# Patient Record
Sex: MALE | Race: WHITE | NOT HISPANIC OR LATINO | Employment: OTHER | URBAN - METROPOLITAN AREA
[De-identification: names, ages, dates, MRNs, and addresses within clinical notes are randomized per-mention and may not be internally consistent; named-entity substitution may affect disease eponyms.]

---

## 2017-04-10 ENCOUNTER — HOSPITAL ENCOUNTER (INPATIENT)
Facility: HOSPITAL | Age: 82
LOS: 2 days | Discharge: HOME | DRG: 064 | End: 2017-04-13
Attending: EMERGENCY MEDICINE | Admitting: INTERNAL MEDICINE
Payer: MEDICARE

## 2017-04-10 ENCOUNTER — APPOINTMENT (EMERGENCY)
Dept: RADIOLOGY | Facility: HOSPITAL | Age: 82
DRG: 064 | End: 2017-04-10
Payer: MEDICARE

## 2017-04-10 DIAGNOSIS — R41.82 CHANGE IN MENTAL STATE: Primary | ICD-10-CM

## 2017-04-10 DIAGNOSIS — G51.4 FACIAL TWITCHING: ICD-10-CM

## 2017-04-10 DIAGNOSIS — R82.81 PYURIA: ICD-10-CM

## 2017-04-10 DIAGNOSIS — N17.9 AKI (ACUTE KIDNEY INJURY) (HCC): ICD-10-CM

## 2017-04-10 DIAGNOSIS — I63.9 ACUTE CVA (CEREBROVASCULAR ACCIDENT) (HCC): ICD-10-CM

## 2017-04-10 LAB
ABO GROUP BLD: NORMAL
ANION GAP SERPL CALCULATED.3IONS-SCNC: 15 MMOL/L (ref 4–13)
APTT PPP: 23 SECONDS (ref 24–36)
BACTERIA UR QL AUTO: ABNORMAL /HPF
BILIRUB UR QL STRIP: NEGATIVE
BLD GP AB SCN SERPL QL: NEGATIVE
BUN SERPL-MCNC: 37 MG/DL (ref 5–25)
CALCIUM SERPL-MCNC: 9 MG/DL (ref 8.3–10.1)
CHLORIDE SERPL-SCNC: 100 MMOL/L (ref 100–108)
CLARITY UR: ABNORMAL
CO2 SERPL-SCNC: 23 MMOL/L (ref 21–32)
COLOR UR: ABNORMAL
CREAT SERPL-MCNC: 1.94 MG/DL (ref 0.6–1.3)
CREAT UR-MCNC: <13 MG/DL
ERYTHROCYTE [DISTWIDTH] IN BLOOD BY AUTOMATED COUNT: 17.3 % (ref 11.6–15.1)
ERYTHROCYTE [SEDIMENTATION RATE] IN BLOOD: 11 MM/HOUR (ref 2–10)
GFR SERPL CREATININE-BSD FRML MDRD: 32.7 ML/MIN/1.73SQ M
GLUCOSE SERPL-MCNC: 97 MG/DL (ref 65–140)
GLUCOSE UR STRIP-MCNC: NEGATIVE MG/DL
HCT VFR BLD AUTO: 29.4 % (ref 42–52)
HGB BLD-MCNC: 9.3 G/DL (ref 14–18)
HGB UR QL STRIP.AUTO: ABNORMAL
INR PPP: 0.95 (ref 0.86–1.16)
KETONES UR STRIP-MCNC: ABNORMAL MG/DL
LEUKOCYTE ESTERASE UR QL STRIP: ABNORMAL
MCH RBC QN AUTO: 24.3 PG (ref 27–31)
MCHC RBC AUTO-ENTMCNC: 31.5 G/DL (ref 31.4–37.4)
MCV RBC AUTO: 77 FL (ref 82–98)
NITRITE UR QL STRIP: POSITIVE
NON-SQ EPI CELLS URNS QL MICRO: ABNORMAL /HPF
PH UR STRIP.AUTO: 6 [PH] (ref 5–9)
PLATELET # BLD AUTO: 196 THOUSANDS/UL (ref 130–400)
PLATELET # BLD AUTO: 234 THOUSANDS/UL (ref 130–400)
PMV BLD AUTO: 10.6 FL (ref 8.9–12.7)
PMV BLD AUTO: 10.8 FL (ref 8.9–12.7)
POTASSIUM SERPL-SCNC: 4.1 MMOL/L (ref 3.5–5.3)
PROT UR STRIP-MCNC: NEGATIVE MG/DL
PROTHROMBIN TIME: 10 SECONDS (ref 9.4–11.7)
RBC # BLD AUTO: 3.82 MILLION/UL (ref 4.7–6.1)
RBC #/AREA URNS AUTO: ABNORMAL /HPF
RH BLD: POSITIVE
SODIUM 24H UR-SCNC: 122 MOL/L
SODIUM SERPL-SCNC: 138 MMOL/L (ref 136–145)
SP GR UR STRIP.AUTO: 1.01 (ref 1–1.03)
TROPONIN I SERPL-MCNC: 0.02 NG/ML
UROBILINOGEN UR QL STRIP.AUTO: 0.2 E.U./DL
WBC # BLD AUTO: 4.8 THOUSAND/UL (ref 4.8–10.8)
WBC #/AREA URNS AUTO: ABNORMAL /HPF

## 2017-04-10 PROCEDURE — 94760 N-INVAS EAR/PLS OXIMETRY 1: CPT

## 2017-04-10 PROCEDURE — 96361 HYDRATE IV INFUSION ADD-ON: CPT

## 2017-04-10 PROCEDURE — 71010 HB CHEST X-RAY 1 VIEW FRONTAL (PORTABLE): CPT

## 2017-04-10 PROCEDURE — 99285 EMERGENCY DEPT VISIT HI MDM: CPT

## 2017-04-10 PROCEDURE — 87040 BLOOD CULTURE FOR BACTERIA: CPT | Performed by: INTERNAL MEDICINE

## 2017-04-10 PROCEDURE — 85652 RBC SED RATE AUTOMATED: CPT | Performed by: EMERGENCY MEDICINE

## 2017-04-10 PROCEDURE — 87081 CULTURE SCREEN ONLY: CPT | Performed by: INTERNAL MEDICINE

## 2017-04-10 PROCEDURE — 80048 BASIC METABOLIC PNL TOTAL CA: CPT | Performed by: EMERGENCY MEDICINE

## 2017-04-10 PROCEDURE — 86900 BLOOD TYPING SEROLOGIC ABO: CPT | Performed by: EMERGENCY MEDICINE

## 2017-04-10 PROCEDURE — 87077 CULTURE AEROBIC IDENTIFY: CPT | Performed by: EMERGENCY MEDICINE

## 2017-04-10 PROCEDURE — 81001 URINALYSIS AUTO W/SCOPE: CPT | Performed by: EMERGENCY MEDICINE

## 2017-04-10 PROCEDURE — 93005 ELECTROCARDIOGRAM TRACING: CPT | Performed by: EMERGENCY MEDICINE

## 2017-04-10 PROCEDURE — 82570 ASSAY OF URINE CREATININE: CPT | Performed by: INTERNAL MEDICINE

## 2017-04-10 PROCEDURE — 85610 PROTHROMBIN TIME: CPT | Performed by: EMERGENCY MEDICINE

## 2017-04-10 PROCEDURE — 87086 URINE CULTURE/COLONY COUNT: CPT | Performed by: EMERGENCY MEDICINE

## 2017-04-10 PROCEDURE — 93005 ELECTROCARDIOGRAM TRACING: CPT

## 2017-04-10 PROCEDURE — 86901 BLOOD TYPING SEROLOGIC RH(D): CPT | Performed by: EMERGENCY MEDICINE

## 2017-04-10 PROCEDURE — 36415 COLL VENOUS BLD VENIPUNCTURE: CPT | Performed by: EMERGENCY MEDICINE

## 2017-04-10 PROCEDURE — 86850 RBC ANTIBODY SCREEN: CPT | Performed by: EMERGENCY MEDICINE

## 2017-04-10 PROCEDURE — 84300 ASSAY OF URINE SODIUM: CPT | Performed by: INTERNAL MEDICINE

## 2017-04-10 PROCEDURE — 85730 THROMBOPLASTIN TIME PARTIAL: CPT | Performed by: EMERGENCY MEDICINE

## 2017-04-10 PROCEDURE — 84484 ASSAY OF TROPONIN QUANT: CPT | Performed by: EMERGENCY MEDICINE

## 2017-04-10 PROCEDURE — 96360 HYDRATION IV INFUSION INIT: CPT

## 2017-04-10 PROCEDURE — 87186 SC STD MICRODIL/AGAR DIL: CPT | Performed by: EMERGENCY MEDICINE

## 2017-04-10 PROCEDURE — 70450 CT HEAD/BRAIN W/O DYE: CPT

## 2017-04-10 PROCEDURE — 85049 AUTOMATED PLATELET COUNT: CPT | Performed by: INTERNAL MEDICINE

## 2017-04-10 PROCEDURE — 85027 COMPLETE CBC AUTOMATED: CPT | Performed by: EMERGENCY MEDICINE

## 2017-04-10 RX ORDER — PANTOPRAZOLE SODIUM 40 MG/1
40 TABLET, DELAYED RELEASE ORAL DAILY
Status: DISCONTINUED | OUTPATIENT
Start: 2017-04-11 | End: 2017-04-13 | Stop reason: HOSPADM

## 2017-04-10 RX ORDER — MAGNESIUM HYDROXIDE/ALUMINUM HYDROXICE/SIMETHICONE 120; 1200; 1200 MG/30ML; MG/30ML; MG/30ML
30 SUSPENSION ORAL EVERY 4 HOURS PRN
Status: DISCONTINUED | OUTPATIENT
Start: 2017-04-10 | End: 2017-04-11

## 2017-04-10 RX ORDER — PANTOPRAZOLE SODIUM 40 MG/1
40 TABLET, DELAYED RELEASE ORAL DAILY
COMMUNITY

## 2017-04-10 RX ORDER — ATORVASTATIN CALCIUM 80 MG/1
80 TABLET, FILM COATED ORAL EVERY EVENING
Status: DISCONTINUED | OUTPATIENT
Start: 2017-04-10 | End: 2017-04-13

## 2017-04-10 RX ORDER — AMOXICILLIN 250 MG
1 CAPSULE ORAL 2 TIMES DAILY
Status: DISCONTINUED | OUTPATIENT
Start: 2017-04-10 | End: 2017-04-13 | Stop reason: HOSPADM

## 2017-04-10 RX ORDER — MELOXICAM 15 MG/1
15 TABLET ORAL DAILY
COMMUNITY
End: 2017-04-13 | Stop reason: HOSPADM

## 2017-04-10 RX ORDER — SODIUM CHLORIDE 9 MG/ML
75 INJECTION, SOLUTION INTRAVENOUS CONTINUOUS
Status: DISCONTINUED | OUTPATIENT
Start: 2017-04-10 | End: 2017-04-13

## 2017-04-10 RX ORDER — HEPARIN SODIUM 5000 [USP'U]/ML
5000 INJECTION, SOLUTION INTRAVENOUS; SUBCUTANEOUS EVERY 8 HOURS SCHEDULED
Status: DISCONTINUED | OUTPATIENT
Start: 2017-04-10 | End: 2017-04-11

## 2017-04-10 RX ORDER — SODIUM CHLORIDE 9 MG/ML
125 INJECTION, SOLUTION INTRAVENOUS CONTINUOUS
Status: DISCONTINUED | OUTPATIENT
Start: 2017-04-10 | End: 2017-04-10

## 2017-04-10 RX ADMIN — SODIUM CHLORIDE 500 ML: 0.9 INJECTION, SOLUTION INTRAVENOUS at 10:44

## 2017-04-10 RX ADMIN — CEFTRIAXONE 1000 MG: 1 INJECTION, POWDER, FOR SOLUTION INTRAMUSCULAR; INTRAVENOUS at 20:35

## 2017-04-10 RX ADMIN — SODIUM CHLORIDE 125 ML/HR: 0.9 INJECTION, SOLUTION INTRAVENOUS at 12:48

## 2017-04-10 RX ADMIN — Medication 1 TABLET: at 19:56

## 2017-04-10 RX ADMIN — SODIUM CHLORIDE 75 ML/HR: 0.9 INJECTION, SOLUTION INTRAVENOUS at 19:56

## 2017-04-10 RX ADMIN — ATORVASTATIN CALCIUM 80 MG: 80 TABLET, FILM COATED ORAL at 19:56

## 2017-04-10 RX ADMIN — HEPARIN SODIUM 5000 UNITS: 5000 INJECTION, SOLUTION INTRAVENOUS; SUBCUTANEOUS at 22:04

## 2017-04-10 RX ADMIN — ALUMINUM HYDROXIDE, MAGNESIUM HYDROXIDE, AND SIMETHICONE 30 ML: 200; 200; 20 SUSPENSION ORAL at 22:50

## 2017-04-11 ENCOUNTER — APPOINTMENT (OUTPATIENT)
Dept: OCCUPATIONAL THERAPY | Facility: HOSPITAL | Age: 82
DRG: 064 | End: 2017-04-11
Payer: MEDICARE

## 2017-04-11 ENCOUNTER — APPOINTMENT (OUTPATIENT)
Dept: RADIOLOGY | Facility: HOSPITAL | Age: 82
DRG: 064 | End: 2017-04-11
Payer: MEDICARE

## 2017-04-11 PROBLEM — I10 ACCELERATED HYPERTENSION: Status: ACTIVE | Noted: 2017-04-11

## 2017-04-11 PROBLEM — N17.9 AKI (ACUTE KIDNEY INJURY) (HCC): Status: ACTIVE | Noted: 2017-04-11

## 2017-04-11 PROBLEM — E78.5 HYPERLIPIDEMIA: Status: ACTIVE | Noted: 2017-04-11

## 2017-04-11 PROBLEM — E88.09 HYPOALBUMINEMIA: Status: ACTIVE | Noted: 2017-04-11

## 2017-04-11 PROBLEM — R41.82 ALTERED MENTAL STATUS: Status: ACTIVE | Noted: 2017-04-11

## 2017-04-11 PROBLEM — D64.9 ANEMIA: Status: ACTIVE | Noted: 2017-04-11

## 2017-04-11 PROBLEM — R82.81 PYURIA: Status: ACTIVE | Noted: 2017-04-11

## 2017-04-11 PROBLEM — R47.81 SLURRED SPEECH: Status: ACTIVE | Noted: 2017-04-11

## 2017-04-11 LAB
ALBUMIN SERPL BCP-MCNC: 2.6 G/DL (ref 3.5–5)
ALP SERPL-CCNC: 57 U/L (ref 46–116)
ALT SERPL W P-5'-P-CCNC: 12 U/L (ref 12–78)
ANION GAP SERPL CALCULATED.3IONS-SCNC: 12 MMOL/L (ref 4–13)
AST SERPL W P-5'-P-CCNC: 18 U/L (ref 5–45)
BASOPHILS # BLD AUTO: 0 THOUSANDS/ΜL (ref 0–0.1)
BASOPHILS NFR BLD AUTO: 0 % (ref 0–1)
BILIRUB DIRECT SERPL-MCNC: 0.1 MG/DL (ref 0–0.2)
BILIRUB SERPL-MCNC: 0.2 MG/DL (ref 0.2–1)
BILIRUB UR QL STRIP: NEGATIVE
BUN SERPL-MCNC: 30 MG/DL (ref 5–25)
CALCIUM SERPL-MCNC: 7.9 MG/DL (ref 8.3–10.1)
CHLORIDE SERPL-SCNC: 106 MMOL/L (ref 100–108)
CHOLEST SERPL-MCNC: 197 MG/DL (ref 50–200)
CLARITY UR: ABNORMAL
CO2 SERPL-SCNC: 20 MMOL/L (ref 21–32)
COLOR UR: ABNORMAL
CREAT SERPL-MCNC: 1.52 MG/DL (ref 0.6–1.3)
EOSINOPHIL # BLD AUTO: 0.1 THOUSAND/ΜL (ref 0–0.61)
EOSINOPHIL NFR BLD AUTO: 3 % (ref 0–6)
ERYTHROCYTE [DISTWIDTH] IN BLOOD BY AUTOMATED COUNT: 17.2 % (ref 11.6–15.1)
FERRITIN SERPL-MCNC: 11 NG/ML (ref 8–388)
FOLATE SERPL-MCNC: 5.2 NG/ML (ref 3.1–17.5)
GFR SERPL CREATININE-BSD FRML MDRD: 43.3 ML/MIN/1.73SQ M
GIANT PLATELETS BLD QL SMEAR: PRESENT
GLUCOSE SERPL-MCNC: 80 MG/DL (ref 65–140)
GLUCOSE UR STRIP-MCNC: NEGATIVE MG/DL
HCT VFR BLD AUTO: 24.4 % (ref 42–52)
HCT VFR BLD AUTO: 27.9 % (ref 42–52)
HDLC SERPL-MCNC: 49 MG/DL (ref 40–60)
HGB BLD-MCNC: 7.7 G/DL (ref 14–18)
HGB BLD-MCNC: 8.8 G/DL (ref 14–18)
HGB UR QL STRIP.AUTO: ABNORMAL
HYPERCHROMIA BLD QL SMEAR: PRESENT
IRON SATN MFR SERPL: 10 %
IRON SERPL-MCNC: 33 UG/DL (ref 65–175)
KETONES UR STRIP-MCNC: ABNORMAL MG/DL
LDLC SERPL CALC-MCNC: 134 MG/DL (ref 0–100)
LEUKOCYTE ESTERASE UR QL STRIP: ABNORMAL
LYMPHOCYTES # BLD AUTO: 1.4 THOUSANDS/ΜL (ref 0.6–4.47)
LYMPHOCYTES NFR BLD AUTO: 25 % (ref 14–44)
MAGNESIUM SERPL-MCNC: 2.1 MG/DL (ref 1.6–2.6)
MCH RBC QN AUTO: 24.5 PG (ref 27–31)
MCHC RBC AUTO-ENTMCNC: 31.7 G/DL (ref 31.4–37.4)
MCV RBC AUTO: 77 FL (ref 82–98)
MICROCYTES BLD QL AUTO: PRESENT
MONOCYTES # BLD AUTO: 0.6 THOUSAND/ΜL (ref 0.17–1.22)
MONOCYTES NFR BLD AUTO: 10 % (ref 4–12)
NEUTROPHILS # BLD AUTO: 3.6 THOUSANDS/ΜL (ref 1.85–7.62)
NEUTS SEG NFR BLD AUTO: 63 % (ref 43–75)
NITRITE UR QL STRIP: POSITIVE
NON-SQ EPI CELLS URNS QL MICRO: ABNORMAL /HPF
NRBC BLD AUTO-RTO: 0 /100 WBCS
PH UR STRIP.AUTO: 6 [PH] (ref 5–9)
PLATELET # BLD AUTO: 179 THOUSANDS/UL (ref 130–400)
PLATELET BLD QL SMEAR: ADEQUATE
PMV BLD AUTO: 11 FL (ref 8.9–12.7)
POTASSIUM SERPL-SCNC: 4.1 MMOL/L (ref 3.5–5.3)
PROT SERPL-MCNC: 5.7 G/DL (ref 6.4–8.2)
PROT UR STRIP-MCNC: ABNORMAL MG/DL
RBC # BLD AUTO: 3.15 MILLION/UL (ref 4.7–6.1)
RBC #/AREA URNS AUTO: ABNORMAL /HPF
SODIUM SERPL-SCNC: 138 MMOL/L (ref 136–145)
SP GR UR STRIP.AUTO: 1.02 (ref 1–1.03)
TIBC SERPL-MCNC: 331 UG/DL (ref 250–450)
TRIGL SERPL-MCNC: 70 MG/DL
UROBILINOGEN UR QL STRIP.AUTO: 0.2 E.U./DL
VIT B12 SERPL-MCNC: 347 PG/ML (ref 100–900)
WBC # BLD AUTO: 5.8 THOUSAND/UL (ref 4.8–10.8)
WBC #/AREA URNS AUTO: ABNORMAL /HPF

## 2017-04-11 PROCEDURE — G8979 MOBILITY GOAL STATUS: HCPCS

## 2017-04-11 PROCEDURE — 80061 LIPID PANEL: CPT | Performed by: INTERNAL MEDICINE

## 2017-04-11 PROCEDURE — 97162 PT EVAL MOD COMPLEX 30 MIN: CPT

## 2017-04-11 PROCEDURE — 82607 VITAMIN B-12: CPT | Performed by: INTERNAL MEDICINE

## 2017-04-11 PROCEDURE — 92610 EVALUATE SWALLOWING FUNCTION: CPT

## 2017-04-11 PROCEDURE — 80076 HEPATIC FUNCTION PANEL: CPT | Performed by: INTERNAL MEDICINE

## 2017-04-11 PROCEDURE — 81001 URINALYSIS AUTO W/SCOPE: CPT | Performed by: INTERNAL MEDICINE

## 2017-04-11 PROCEDURE — 82746 ASSAY OF FOLIC ACID SERUM: CPT | Performed by: INTERNAL MEDICINE

## 2017-04-11 PROCEDURE — 87147 CULTURE TYPE IMMUNOLOGIC: CPT | Performed by: INTERNAL MEDICINE

## 2017-04-11 PROCEDURE — G8997 SWALLOW GOAL STATUS: HCPCS

## 2017-04-11 PROCEDURE — 85025 COMPLETE CBC W/AUTO DIFF WBC: CPT | Performed by: INTERNAL MEDICINE

## 2017-04-11 PROCEDURE — G8988 SELF CARE GOAL STATUS: HCPCS

## 2017-04-11 PROCEDURE — 85014 HEMATOCRIT: CPT | Performed by: INTERNAL MEDICINE

## 2017-04-11 PROCEDURE — 87086 URINE CULTURE/COLONY COUNT: CPT | Performed by: INTERNAL MEDICINE

## 2017-04-11 PROCEDURE — 82272 OCCULT BLD FECES 1-3 TESTS: CPT | Performed by: INTERNAL MEDICINE

## 2017-04-11 PROCEDURE — 87186 SC STD MICRODIL/AGAR DIL: CPT | Performed by: INTERNAL MEDICINE

## 2017-04-11 PROCEDURE — 82728 ASSAY OF FERRITIN: CPT | Performed by: INTERNAL MEDICINE

## 2017-04-11 PROCEDURE — 93880 EXTRACRANIAL BILAT STUDY: CPT

## 2017-04-11 PROCEDURE — 80048 BASIC METABOLIC PNL TOTAL CA: CPT | Performed by: INTERNAL MEDICINE

## 2017-04-11 PROCEDURE — 83735 ASSAY OF MAGNESIUM: CPT | Performed by: INTERNAL MEDICINE

## 2017-04-11 PROCEDURE — 97166 OT EVAL MOD COMPLEX 45 MIN: CPT

## 2017-04-11 PROCEDURE — G8987 SELF CARE CURRENT STATUS: HCPCS

## 2017-04-11 PROCEDURE — 85018 HEMOGLOBIN: CPT | Performed by: INTERNAL MEDICINE

## 2017-04-11 PROCEDURE — 83540 ASSAY OF IRON: CPT | Performed by: INTERNAL MEDICINE

## 2017-04-11 PROCEDURE — G8996 SWALLOW CURRENT STATUS: HCPCS

## 2017-04-11 PROCEDURE — 97110 THERAPEUTIC EXERCISES: CPT

## 2017-04-11 PROCEDURE — 83550 IRON BINDING TEST: CPT | Performed by: INTERNAL MEDICINE

## 2017-04-11 PROCEDURE — G8978 MOBILITY CURRENT STATUS: HCPCS

## 2017-04-11 RX ORDER — HYDRALAZINE HYDROCHLORIDE 20 MG/ML
10 INJECTION INTRAMUSCULAR; INTRAVENOUS EVERY 6 HOURS PRN
Status: DISCONTINUED | OUTPATIENT
Start: 2017-04-11 | End: 2017-04-11

## 2017-04-11 RX ORDER — SUCRALFATE 1 G/1
1 TABLET ORAL
Status: DISCONTINUED | OUTPATIENT
Start: 2017-04-11 | End: 2017-04-13 | Stop reason: HOSPADM

## 2017-04-11 RX ORDER — HYDRALAZINE HYDROCHLORIDE 20 MG/ML
10 INJECTION INTRAMUSCULAR; INTRAVENOUS EVERY 6 HOURS PRN
Status: DISCONTINUED | OUTPATIENT
Start: 2017-04-11 | End: 2017-04-13

## 2017-04-11 RX ORDER — POLYETHYLENE GLYCOL 3350 17 G/17G
17 POWDER, FOR SOLUTION ORAL DAILY PRN
Status: DISCONTINUED | OUTPATIENT
Start: 2017-04-11 | End: 2017-04-13 | Stop reason: HOSPADM

## 2017-04-11 RX ORDER — CALCIUM CARBONATE 200(500)MG
500 TABLET,CHEWABLE ORAL 2 TIMES DAILY PRN
Status: DISCONTINUED | OUTPATIENT
Start: 2017-04-11 | End: 2017-04-13 | Stop reason: HOSPADM

## 2017-04-11 RX ADMIN — SODIUM CHLORIDE 75 ML/HR: 0.9 INJECTION, SOLUTION INTRAVENOUS at 06:03

## 2017-04-11 RX ADMIN — HYDRALAZINE HYDROCHLORIDE 10 MG: 20 INJECTION INTRAMUSCULAR; INTRAVENOUS at 09:47

## 2017-04-11 RX ADMIN — HEPARIN SODIUM 5000 UNITS: 5000 INJECTION, SOLUTION INTRAVENOUS; SUBCUTANEOUS at 06:03

## 2017-04-11 RX ADMIN — Medication 1 TABLET: at 09:10

## 2017-04-11 RX ADMIN — IRON SUCROSE 200 MG: 20 INJECTION, SOLUTION INTRAVENOUS at 18:42

## 2017-04-11 RX ADMIN — CEFTRIAXONE 1000 MG: 1 INJECTION, POWDER, FOR SOLUTION INTRAMUSCULAR; INTRAVENOUS at 20:42

## 2017-04-11 RX ADMIN — SUCRALFATE 1 G: 1 TABLET ORAL at 11:24

## 2017-04-11 RX ADMIN — POLYETHYLENE GLYCOL 3350 17 G: 17 POWDER, FOR SOLUTION ORAL at 17:43

## 2017-04-11 RX ADMIN — PANTOPRAZOLE SODIUM 40 MG: 40 TABLET, DELAYED RELEASE ORAL at 09:10

## 2017-04-11 RX ADMIN — Medication 500 MG: at 21:57

## 2017-04-11 RX ADMIN — SODIUM CHLORIDE 75 ML/HR: 0.9 INJECTION, SOLUTION INTRAVENOUS at 18:42

## 2017-04-11 RX ADMIN — Medication 500 MG: at 16:25

## 2017-04-11 RX ADMIN — ATORVASTATIN CALCIUM 80 MG: 80 TABLET, FILM COATED ORAL at 17:44

## 2017-04-11 RX ADMIN — SUCRALFATE 1 G: 1 TABLET ORAL at 16:25

## 2017-04-11 RX ADMIN — Medication 1 TABLET: at 17:44

## 2017-04-12 ENCOUNTER — APPOINTMENT (INPATIENT)
Dept: RADIOLOGY | Facility: HOSPITAL | Age: 82
DRG: 064 | End: 2017-04-12
Payer: MEDICARE

## 2017-04-12 ENCOUNTER — APPOINTMENT (INPATIENT)
Dept: NON INVASIVE DIAGNOSTICS | Facility: HOSPITAL | Age: 82
DRG: 064 | End: 2017-04-12
Payer: MEDICARE

## 2017-04-12 PROBLEM — N39.0 URINARY TRACT INFECTION ASSOCIATED WITH INDWELLING URETHRAL CATHETER (HCC): Status: ACTIVE | Noted: 2017-04-12

## 2017-04-12 PROBLEM — D50.9 IDA (IRON DEFICIENCY ANEMIA): Status: ACTIVE | Noted: 2017-04-12

## 2017-04-12 PROBLEM — T83.511A URINARY TRACT INFECTION ASSOCIATED WITH INDWELLING URETHRAL CATHETER (HCC): Status: ACTIVE | Noted: 2017-04-12

## 2017-04-12 PROBLEM — Z97.8 CHRONIC INDWELLING FOLEY CATHETER: Chronic | Status: ACTIVE | Noted: 2017-04-12

## 2017-04-12 LAB
ANION GAP SERPL CALCULATED.3IONS-SCNC: 12 MMOL/L (ref 4–13)
ANISOCYTOSIS BLD QL SMEAR: PRESENT
BASOPHILS # BLD AUTO: 0 THOUSANDS/ΜL (ref 0–0.1)
BASOPHILS NFR BLD AUTO: 0 % (ref 0–1)
BUN SERPL-MCNC: 26 MG/DL (ref 5–25)
CALCIUM SERPL-MCNC: 8.3 MG/DL (ref 8.3–10.1)
CHLORIDE SERPL-SCNC: 105 MMOL/L (ref 100–108)
CO2 SERPL-SCNC: 20 MMOL/L (ref 21–32)
CREAT SERPL-MCNC: 1.33 MG/DL (ref 0.6–1.3)
EOSINOPHIL # BLD AUTO: 0.1 THOUSAND/ΜL (ref 0–0.61)
EOSINOPHIL NFR BLD AUTO: 1 % (ref 0–6)
ERYTHROCYTE [DISTWIDTH] IN BLOOD BY AUTOMATED COUNT: 16 % (ref 11.6–15.1)
EST. AVERAGE GLUCOSE BLD GHB EST-MCNC: 126 MG/DL
GFR SERPL CREATININE-BSD FRML MDRD: 50.5 ML/MIN/1.73SQ M
GLUCOSE SERPL-MCNC: 103 MG/DL (ref 65–140)
HBA1C MFR BLD: 6 % (ref 4.2–6.3)
HCT VFR BLD AUTO: 27.6 % (ref 42–52)
HGB BLD-MCNC: 8.5 G/DL (ref 14–18)
HYPERCHROMIA BLD QL SMEAR: PRESENT
LYMPHOCYTES # BLD AUTO: 1 THOUSANDS/ΜL (ref 0.6–4.47)
LYMPHOCYTES NFR BLD AUTO: 16 % (ref 14–44)
MCH RBC QN AUTO: 24.1 PG (ref 27–31)
MCHC RBC AUTO-ENTMCNC: 30.8 G/DL (ref 31.4–37.4)
MCV RBC AUTO: 78 FL (ref 82–98)
MONOCYTES # BLD AUTO: 0.7 THOUSAND/ΜL (ref 0.17–1.22)
MONOCYTES NFR BLD AUTO: 12 % (ref 4–12)
MRSA NOSE QL CULT: NORMAL
NEUTROPHILS # BLD AUTO: 4.3 THOUSANDS/ΜL (ref 1.85–7.62)
NEUTS SEG NFR BLD AUTO: 71 % (ref 43–75)
OVALOCYTES BLD QL SMEAR: PRESENT
PLATELET # BLD AUTO: 209 THOUSANDS/UL (ref 130–400)
PLATELET BLD QL SMEAR: ADEQUATE
PMV BLD AUTO: 10.9 FL (ref 8.9–12.7)
POTASSIUM SERPL-SCNC: 4.3 MMOL/L (ref 3.5–5.3)
RBC # BLD AUTO: 3.53 MILLION/UL (ref 4.7–6.1)
SODIUM SERPL-SCNC: 137 MMOL/L (ref 136–145)
WBC # BLD AUTO: 6.2 THOUSAND/UL (ref 4.8–10.8)

## 2017-04-12 PROCEDURE — 93306 TTE W/DOPPLER COMPLETE: CPT

## 2017-04-12 PROCEDURE — 76770 US EXAM ABDO BACK WALL COMP: CPT

## 2017-04-12 PROCEDURE — 80048 BASIC METABOLIC PNL TOTAL CA: CPT | Performed by: INTERNAL MEDICINE

## 2017-04-12 PROCEDURE — 87081 CULTURE SCREEN ONLY: CPT | Performed by: INTERNAL MEDICINE

## 2017-04-12 PROCEDURE — 70551 MRI BRAIN STEM W/O DYE: CPT

## 2017-04-12 PROCEDURE — 83036 HEMOGLOBIN GLYCOSYLATED A1C: CPT | Performed by: PSYCHIATRY & NEUROLOGY

## 2017-04-12 PROCEDURE — 85025 COMPLETE CBC W/AUTO DIFF WBC: CPT | Performed by: INTERNAL MEDICINE

## 2017-04-12 RX ORDER — ASPIRIN 81 MG/1
81 TABLET, CHEWABLE ORAL DAILY
Status: DISCONTINUED | OUTPATIENT
Start: 2017-04-12 | End: 2017-04-13 | Stop reason: HOSPADM

## 2017-04-12 RX ORDER — LORAZEPAM 0.5 MG/1
0.5 TABLET ORAL ONCE
Status: COMPLETED | OUTPATIENT
Start: 2017-04-12 | End: 2017-04-12

## 2017-04-12 RX ADMIN — SODIUM CHLORIDE 75 ML/HR: 0.9 INJECTION, SOLUTION INTRAVENOUS at 09:34

## 2017-04-12 RX ADMIN — SUCRALFATE 1 G: 1 TABLET ORAL at 16:06

## 2017-04-12 RX ADMIN — SUCRALFATE 1 G: 1 TABLET ORAL at 06:56

## 2017-04-12 RX ADMIN — PANTOPRAZOLE SODIUM 40 MG: 40 TABLET, DELAYED RELEASE ORAL at 09:34

## 2017-04-12 RX ADMIN — ATORVASTATIN CALCIUM 80 MG: 80 TABLET, FILM COATED ORAL at 17:15

## 2017-04-12 RX ADMIN — Medication 1 TABLET: at 09:34

## 2017-04-12 RX ADMIN — CEFTRIAXONE 1000 MG: 1 INJECTION, POWDER, FOR SOLUTION INTRAMUSCULAR; INTRAVENOUS at 20:53

## 2017-04-12 RX ADMIN — SUCRALFATE 1 G: 1 TABLET ORAL at 11:35

## 2017-04-12 RX ADMIN — SODIUM CHLORIDE 75 ML/HR: 0.9 INJECTION, SOLUTION INTRAVENOUS at 01:33

## 2017-04-12 RX ADMIN — LORAZEPAM 0.5 MG: 0.5 TABLET ORAL at 15:04

## 2017-04-12 RX ADMIN — ASPIRIN 81 MG CHEWABLE TABLET 81 MG: 81 TABLET CHEWABLE at 19:58

## 2017-04-12 RX ADMIN — Medication 1 TABLET: at 17:15

## 2017-04-12 RX ADMIN — HYDRALAZINE HYDROCHLORIDE 10 MG: 20 INJECTION INTRAMUSCULAR; INTRAVENOUS at 02:08

## 2017-04-13 ENCOUNTER — APPOINTMENT (INPATIENT)
Dept: OCCUPATIONAL THERAPY | Facility: HOSPITAL | Age: 82
DRG: 064 | End: 2017-04-13
Payer: MEDICARE

## 2017-04-13 ENCOUNTER — APPOINTMENT (INPATIENT)
Dept: PHYSICAL THERAPY | Facility: HOSPITAL | Age: 82
DRG: 064 | End: 2017-04-13
Payer: MEDICARE

## 2017-04-13 VITALS
TEMPERATURE: 98.7 F | DIASTOLIC BLOOD PRESSURE: 82 MMHG | HEART RATE: 67 BPM | HEIGHT: 60 IN | WEIGHT: 130 LBS | RESPIRATION RATE: 16 BRPM | SYSTOLIC BLOOD PRESSURE: 116 MMHG | OXYGEN SATURATION: 100 % | BODY MASS INDEX: 25.52 KG/M2

## 2017-04-13 LAB
ANION GAP SERPL CALCULATED.3IONS-SCNC: 12 MMOL/L (ref 4–13)
ANISOCYTOSIS BLD QL SMEAR: PRESENT
ANISOCYTOSIS BLD QL SMEAR: PRESENT
BACTERIA UR CULT: NORMAL
BASOPHILS # BLD AUTO: 0.08 THOUSAND/UL (ref 0–0.1)
BASOPHILS NFR MAR MANUAL: 2 % (ref 0–1)
BUN SERPL-MCNC: 19 MG/DL (ref 5–25)
CALCIUM SERPL-MCNC: 8 MG/DL (ref 8.3–10.1)
CHLORIDE SERPL-SCNC: 105 MMOL/L (ref 100–108)
CO2 SERPL-SCNC: 21 MMOL/L (ref 21–32)
CREAT SERPL-MCNC: 1.3 MG/DL (ref 0.6–1.3)
EOSINOPHIL # BLD AUTO: 0.08 THOUSAND/UL (ref 0–0.61)
EOSINOPHIL NFR BLD MANUAL: 2 % (ref 0–6)
ERYTHROCYTE [DISTWIDTH] IN BLOOD BY AUTOMATED COUNT: 17.2 % (ref 11.6–15.1)
GFR SERPL CREATININE-BSD FRML MDRD: 51.9 ML/MIN/1.73SQ M
GIANT PLATELETS BLD QL SMEAR: PRESENT
GIANT PLATELETS BLD QL SMEAR: PRESENT
GLUCOSE SERPL-MCNC: 92 MG/DL (ref 65–140)
HCT VFR BLD AUTO: 22.8 % (ref 42–52)
HCT VFR BLD AUTO: 25.1 % (ref 42–52)
HGB BLD-MCNC: 7.3 G/DL (ref 14–18)
HGB BLD-MCNC: 7.9 G/DL (ref 14–18)
HYPERCHROMIA BLD QL SMEAR: PRESENT
HYPERCHROMIA BLD QL SMEAR: PRESENT
LYMPHOCYTES # BLD AUTO: 0.56 THOUSAND/UL (ref 0.6–4.47)
LYMPHOCYTES # BLD AUTO: 14 %
MAGNESIUM SERPL-MCNC: 1.9 MG/DL (ref 1.6–2.6)
MCH RBC QN AUTO: 24.3 PG (ref 27–31)
MCHC RBC AUTO-ENTMCNC: 31.8 G/DL (ref 31.4–37.4)
MCV RBC AUTO: 76 FL (ref 82–98)
MICROCYTES BLD QL AUTO: PRESENT
MICROCYTES BLD QL AUTO: PRESENT
MONOCYTES # BLD AUTO: 0.32 THOUSAND/UL (ref 0–1.22)
MONOCYTES NFR BLD AUTO: 8 % (ref 4–12)
NEUTS BAND NFR BLD MANUAL: 0 % (ref 0–8)
NEUTS SEG # BLD: 2.96 THOUSAND/UL (ref 1.81–6.82)
NEUTS SEG NFR BLD AUTO: 74 %
NRBC BLD AUTO-RTO: 0 /100 WBCS
PLATELET # BLD AUTO: 169 THOUSANDS/UL (ref 130–400)
PLATELET BLD QL SMEAR: ADEQUATE
PLATELET BLD QL SMEAR: ADEQUATE
PMV BLD AUTO: 11.2 FL (ref 8.9–12.7)
POTASSIUM SERPL-SCNC: 4 MMOL/L (ref 3.5–5.3)
RBC # BLD AUTO: 2.99 MILLION/UL (ref 4.7–6.1)
SODIUM SERPL-SCNC: 138 MMOL/L (ref 136–145)
TOTAL CELLS COUNTED SPEC: 100
WBC # BLD AUTO: 4 THOUSAND/UL (ref 4.8–10.8)

## 2017-04-13 PROCEDURE — 97110 THERAPEUTIC EXERCISES: CPT

## 2017-04-13 PROCEDURE — 85027 COMPLETE CBC AUTOMATED: CPT | Performed by: INTERNAL MEDICINE

## 2017-04-13 PROCEDURE — 80048 BASIC METABOLIC PNL TOTAL CA: CPT | Performed by: INTERNAL MEDICINE

## 2017-04-13 PROCEDURE — 85018 HEMOGLOBIN: CPT | Performed by: INTERNAL MEDICINE

## 2017-04-13 PROCEDURE — 85014 HEMATOCRIT: CPT | Performed by: INTERNAL MEDICINE

## 2017-04-13 PROCEDURE — 85007 BL SMEAR W/DIFF WBC COUNT: CPT | Performed by: INTERNAL MEDICINE

## 2017-04-13 PROCEDURE — 0T2BX0Z CHANGE DRAINAGE DEVICE IN BLADDER, EXTERNAL APPROACH: ICD-10-PCS | Performed by: UROLOGY

## 2017-04-13 PROCEDURE — 83735 ASSAY OF MAGNESIUM: CPT | Performed by: INTERNAL MEDICINE

## 2017-04-13 RX ORDER — ATORVASTATIN CALCIUM 40 MG/1
40 TABLET, FILM COATED ORAL EVERY EVENING
Status: DISCONTINUED | OUTPATIENT
Start: 2017-04-13 | End: 2017-04-13 | Stop reason: HOSPADM

## 2017-04-13 RX ORDER — ATORVASTATIN CALCIUM 40 MG/1
40 TABLET, FILM COATED ORAL EVERY EVENING
Qty: 30 TABLET | Refills: 0 | Status: SHIPPED | OUTPATIENT
Start: 2017-04-13 | End: 2018-02-25

## 2017-04-13 RX ORDER — FERROUS SULFATE 325(65) MG
325 TABLET ORAL
Qty: 90 TABLET | Refills: 0 | Status: ON HOLD | OUTPATIENT
Start: 2017-04-13 | End: 2018-08-05

## 2017-04-13 RX ORDER — ASPIRIN 81 MG/1
81 TABLET, CHEWABLE ORAL DAILY
Qty: 30 TABLET | Refills: 0 | Status: SHIPPED | OUTPATIENT
Start: 2017-04-13 | End: 2018-02-25

## 2017-04-13 RX ORDER — FERROUS SULFATE 325(65) MG
325 TABLET ORAL
Status: DISCONTINUED | OUTPATIENT
Start: 2017-04-13 | End: 2017-04-13 | Stop reason: HOSPADM

## 2017-04-13 RX ADMIN — SUCRALFATE 1 G: 1 TABLET ORAL at 06:39

## 2017-04-13 RX ADMIN — PANTOPRAZOLE SODIUM 40 MG: 40 TABLET, DELAYED RELEASE ORAL at 08:21

## 2017-04-13 RX ADMIN — Medication 1 TABLET: at 08:21

## 2017-04-13 RX ADMIN — ASPIRIN 81 MG CHEWABLE TABLET 81 MG: 81 TABLET CHEWABLE at 08:21

## 2017-04-13 RX ADMIN — SODIUM CHLORIDE 75 ML/HR: 0.9 INJECTION, SOLUTION INTRAVENOUS at 00:36

## 2017-04-13 RX ADMIN — SUCRALFATE 1 G: 1 TABLET ORAL at 17:11

## 2017-04-13 RX ADMIN — SUCRALFATE 1 G: 1 TABLET ORAL at 10:47

## 2017-04-13 RX ADMIN — SODIUM CHLORIDE 75 ML/HR: 0.9 INJECTION, SOLUTION INTRAVENOUS at 14:54

## 2017-04-13 RX ADMIN — FERROUS SULFATE TAB 325 MG (65 MG ELEMENTAL FE) 325 MG: 325 (65 FE) TAB at 17:15

## 2017-04-13 RX ADMIN — Medication 1 TABLET: at 17:12

## 2017-04-13 RX ADMIN — ATORVASTATIN CALCIUM 40 MG: 80 TABLET, FILM COATED ORAL at 17:12

## 2017-04-14 LAB
ATRIAL RATE: 66 BPM
HEMOCCULT STL QL: NEGATIVE
MRSA NOSE QL CULT: NORMAL
P AXIS: 42 DEGREES
PR INTERVAL: 202 MS
QRS AXIS: -48 DEGREES
QRSD INTERVAL: 146 MS
QT INTERVAL: 446 MS
QTC INTERVAL: 467 MS
T WAVE AXIS: 21 DEGREES
VENTRICULAR RATE: 66 BPM

## 2017-04-16 LAB
BACTERIA BLD CULT: NORMAL
BACTERIA BLD CULT: NORMAL

## 2018-02-25 ENCOUNTER — HOSPITAL ENCOUNTER (EMERGENCY)
Facility: HOSPITAL | Age: 83
Discharge: HOME | End: 2018-02-25
Attending: EMERGENCY MEDICINE | Admitting: EMERGENCY MEDICINE
Payer: MEDICARE

## 2018-02-25 ENCOUNTER — APPOINTMENT (EMERGENCY)
Dept: CT IMAGING | Facility: HOSPITAL | Age: 83
End: 2018-02-25
Payer: MEDICARE

## 2018-02-25 VITALS
RESPIRATION RATE: 18 BRPM | BODY MASS INDEX: 25.38 KG/M2 | SYSTOLIC BLOOD PRESSURE: 121 MMHG | TEMPERATURE: 97.8 F | WEIGHT: 125.66 LBS | HEART RATE: 60 BPM | DIASTOLIC BLOOD PRESSURE: 57 MMHG | OXYGEN SATURATION: 97 %

## 2018-02-25 DIAGNOSIS — R55 SYNCOPE: Primary | ICD-10-CM

## 2018-02-25 LAB
ANION GAP SERPL CALCULATED.3IONS-SCNC: 10 MMOL/L (ref 4–13)
APTT PPP: 26 SECONDS (ref 23–35)
ATRIAL RATE: 66 BPM
BASOPHILS # BLD AUTO: 0.03 THOUSANDS/ΜL (ref 0–0.1)
BASOPHILS NFR BLD AUTO: 1 % (ref 0–1)
BUN SERPL-MCNC: 25 MG/DL (ref 5–25)
CALCIUM SERPL-MCNC: 8.9 MG/DL (ref 8.3–10.1)
CHLORIDE SERPL-SCNC: 103 MMOL/L (ref 100–108)
CO2 SERPL-SCNC: 26 MMOL/L (ref 21–32)
CREAT SERPL-MCNC: 1.6 MG/DL (ref 0.6–1.3)
EOSINOPHIL # BLD AUTO: 0.07 THOUSAND/ΜL (ref 0–0.61)
EOSINOPHIL NFR BLD AUTO: 1 % (ref 0–6)
ERYTHROCYTE [DISTWIDTH] IN BLOOD BY AUTOMATED COUNT: 13.5 % (ref 11.6–15.1)
GFR SERPL CREATININE-BSD FRML MDRD: 37 ML/MIN/1.73SQ M
GLUCOSE SERPL-MCNC: 128 MG/DL (ref 65–140)
GLUCOSE SERPL-MCNC: 146 MG/DL (ref 65–140)
HCT VFR BLD AUTO: 38.2 % (ref 36.5–49.3)
HGB BLD-MCNC: 12.5 G/DL (ref 12–17)
INR PPP: 0.94 (ref 0.86–1.16)
LYMPHOCYTES # BLD AUTO: 1.24 THOUSANDS/ΜL (ref 0.6–4.47)
LYMPHOCYTES NFR BLD AUTO: 19 % (ref 14–44)
MCH RBC QN AUTO: 30.7 PG (ref 26.8–34.3)
MCHC RBC AUTO-ENTMCNC: 32.7 G/DL (ref 31.4–37.4)
MCV RBC AUTO: 94 FL (ref 82–98)
MONOCYTES # BLD AUTO: 0.52 THOUSAND/ΜL (ref 0.17–1.22)
MONOCYTES NFR BLD AUTO: 8 % (ref 4–12)
NEUTROPHILS # BLD AUTO: 4.54 THOUSANDS/ΜL (ref 1.85–7.62)
NEUTS SEG NFR BLD AUTO: 71 % (ref 43–75)
P AXIS: 42 DEGREES
PLATELET # BLD AUTO: 140 THOUSANDS/UL (ref 149–390)
PMV BLD AUTO: 12.9 FL (ref 8.9–12.7)
POTASSIUM SERPL-SCNC: 4.2 MMOL/L (ref 3.5–5.3)
PR INTERVAL: 202 MS
PROTHROMBIN TIME: 12.8 SECONDS (ref 12.1–14.4)
QRS AXIS: -69 DEGREES
QRSD INTERVAL: 138 MS
QT INTERVAL: 440 MS
QTC INTERVAL: 451 MS
RBC # BLD AUTO: 4.07 MILLION/UL (ref 3.88–5.62)
SODIUM SERPL-SCNC: 139 MMOL/L (ref 136–145)
T WAVE AXIS: 51 DEGREES
TROPONIN I SERPL-MCNC: 0.02 NG/ML
VENTRICULAR RATE: 63 BPM
WBC # BLD AUTO: 6.4 THOUSAND/UL (ref 4.31–10.16)

## 2018-02-25 PROCEDURE — 85025 COMPLETE CBC W/AUTO DIFF WBC: CPT | Performed by: EMERGENCY MEDICINE

## 2018-02-25 PROCEDURE — 93005 ELECTROCARDIOGRAM TRACING: CPT

## 2018-02-25 PROCEDURE — 99285 EMERGENCY DEPT VISIT HI MDM: CPT

## 2018-02-25 PROCEDURE — 84484 ASSAY OF TROPONIN QUANT: CPT | Performed by: EMERGENCY MEDICINE

## 2018-02-25 PROCEDURE — 70450 CT HEAD/BRAIN W/O DYE: CPT

## 2018-02-25 PROCEDURE — 80048 BASIC METABOLIC PNL TOTAL CA: CPT | Performed by: EMERGENCY MEDICINE

## 2018-02-25 PROCEDURE — 82948 REAGENT STRIP/BLOOD GLUCOSE: CPT

## 2018-02-25 PROCEDURE — 85610 PROTHROMBIN TIME: CPT | Performed by: EMERGENCY MEDICINE

## 2018-02-25 PROCEDURE — 36415 COLL VENOUS BLD VENIPUNCTURE: CPT | Performed by: EMERGENCY MEDICINE

## 2018-02-25 PROCEDURE — 85730 THROMBOPLASTIN TIME PARTIAL: CPT | Performed by: EMERGENCY MEDICINE

## 2018-02-25 PROCEDURE — 96360 HYDRATION IV INFUSION INIT: CPT

## 2018-02-25 RX ORDER — DOCUSATE SODIUM 100 MG/1
200 CAPSULE, LIQUID FILLED ORAL DAILY
COMMUNITY

## 2018-02-25 RX ORDER — LACTULOSE 10 G/10G
10 SOLUTION ORAL
COMMUNITY

## 2018-02-25 RX ADMIN — SODIUM CHLORIDE 1000 ML: 0.9 INJECTION, SOLUTION INTRAVENOUS at 15:36

## 2018-02-25 NOTE — ED PROVIDER NOTES
History  Chief Complaint   Patient presents with    Altered Mental Status     pt presents via EMS from [facility] s/p episode of unresponsiveness x approx 20 minutes  family reports he becames clammy, pale  came around in ambulance, still not completely appropriate  BS PH was 130, has h/o 3 strokes  pt AAOxperson aT THIS TIME  Patient presents to the emergency department via ambulance after a syncopal episode and an unresponsive episode while eating at a restaurant with family prior to arrival   Patient apparently is returning to baseline upon arrival to the emergency department  A fingerstick glucose on arrival was in the 120s  Patient does have a history of strokes  He was in baseline state of health when this event happened  Was no witnessed seizure activity  Was no fall or head trauma  There has been no nausea vomiting or diarrhea  Patient was in baseline state of health upon going out to the restaurant for lunch  Prior to Admission Medications   Prescriptions Last Dose Informant Patient Reported? Taking?   aspirin 81 mg chewable tablet   No Yes   Sig: Chew 1 tablet daily for 30 days   atorvastatin (LIPITOR) 40 mg tablet   No Yes   Sig: Take 1 tablet by mouth every evening for 30 days   docusate sodium (COLACE) 100 mg capsule   Yes Yes   Sig: Take 200 mg by mouth daily   ferrous sulfate 325 (65 Fe) mg tablet   No Yes   Sig: Take 1 tablet by mouth 3 (three) times a day with meals for 30 days   Patient taking differently: Take 325 mg by mouth daily with breakfast     lactulose (CEPHULAC) 10 g packet   Yes Yes   Sig: Take 10 g by mouth daily in the early morning   lisinopril (ZESTRIL) 2 5 mg tablet   Yes Yes   Sig: Take 2 5 mg by mouth 2 (two) times a day     pantoprazole (PROTONIX) 40 mg tablet   Yes Yes   Sig: Take 40 mg by mouth daily   psyllium (METAMUCIL) 0 52 g capsule   Yes Yes   Sig: Take 0 52 g by mouth daily      Facility-Administered Medications: None       Past Medical History: Diagnosis Date    BPH (benign prostatic hyperplasia)     GERD (gastroesophageal reflux disease)     Hernia     Hypertension     Neurogenic bladder     Chronic horn cath   Renal disorder     Stroke ([facility] [address] )     [facility]cer [facility])        Past Surgical History:   Procedure Laterality Date    BACK SURGERY      HERNIA REPAIR      HIP FUSION      JOINT REPLACEMENT      TRANSURETHRAL RESECTION OF BLADDER  09/20/2010    Transurethral resection of bladder neck    TRANSURETHRAL RESECTION OF PROSTATE  03/15/2010       History reviewed  No pertinent family history  I have reviewed and agree with the history as documented  Social History   Substance Use Topics    Smoking status: Never Smoker    Smokeless tobacco: Never Used    Alcohol use No        Review of Systems   Constitutional: Negative  Negative for activity change, appetite change, chills, diaphoresis, fatigue and fever  HENT: Negative  Negative for congestion, dental problem, drooling, rhinorrhea, sinus pressure, sneezing, tinnitus and trouble swallowing  Eyes: Negative  Negative for photophobia and visual disturbance  Respiratory: Negative  Negative for cough, chest tightness, shortness of breath, wheezing and stridor  Cardiovascular: Negative  Negative for chest pain, palpitations and leg swelling  Gastrointestinal: Negative  Negative for abdominal pain, anal bleeding, blood in stool, constipation, diarrhea, nausea and rectal pain  Endocrine: Negative  Genitourinary: Negative  Negative for dysuria, hematuria and urgency  Musculoskeletal: Negative  Negative for back pain, neck pain and neck stiffness  Skin: Negative  Negative for rash and wound  Allergic/Immunologic: Negative  Neurological: Positive for syncope  Negative for dizziness, seizures, speech difficulty, weakness, light-headedness, numbness and headaches  Hematological: Negative  Does not bruise/bleed easily  Psychiatric/Behavioral: Negative  Physical Exam  ED Triage Vitals [02/25/18 1533]   Temperature Pulse Respirations Blood Pressure SpO2   97 8 °F (36 6 °C) 60 18 121/57 97 %      Temp Source Heart Rate Source Patient Position - Orthostatic VS BP Location FiO2 (%)   Oral Monitor Sitting Left arm --      Pain Score       No Pain           Orthostatic Vital Signs  Vitals:    02/25/18 1533   BP: 121/57   Pulse: 60   Patient Position - Orthostatic VS: Sitting       Physical Exam   Constitutional: He is oriented to person, place, and time  He appears well-developed and well-nourished  Nontoxic appearance without respiratory distress  Patient is frail in appearance  Does not look uncomfortable sitting upright on the stretcher  HENT:   Head: Normocephalic and atraumatic  Right Ear: External ear normal    Left Ear: External ear normal    Mouth/Throat: Oropharynx is clear and moist    Eyes: Conjunctivae and EOM are normal  Pupils are equal, round, and reactive to light  Neck: Normal range of motion  Neck supple  Cardiovascular: Normal rate, regular rhythm, normal heart sounds and intact distal pulses  Pulmonary/Chest: Effort normal and breath sounds normal  No respiratory distress  He has no wheezes  He has no rales  He exhibits no tenderness  Abdominal: Soft  Bowel sounds are normal  He exhibits no distension and no mass  There is no tenderness  There is no rebound and no guarding  No hernia  Musculoskeletal: Normal range of motion  He exhibits no edema, tenderness or deformity  Neurological: He is alert and oriented to person, place, and time  He has normal reflexes  He displays normal reflexes  No cranial nerve deficit or sensory deficit  He exhibits normal muscle tone  Coordination normal    Skin: Skin is warm and dry  No rash noted  No erythema  Psychiatric: He has a normal mood and affect  His behavior is normal  Judgment and thought content normal    Nursing note and vitals reviewed        ED Medications  Medications   sodium chloride 0 9 % bolus 1,000 mL (0 mL Intravenous Stopped 2/25/18 1610)       Diagnostic Studies  Results Reviewed     Procedure Component Value Units Date/Time    Troponin I [[id number]]  (Normal) Collected:  02/25/18 1536    Lab Status:  Final result Specimen:  Blood from Arm, Right Updated:  02/25/18 1604     Troponin I 0 02 ng/mL     Narrative:         [employer] Chemistry analyzer 99% cutoff is > 0 04 ng/mL in network labs    o cTnI 99% cutoff is useful only when applied to patients in the clinical setting of myocardial ischemia  o cTnI 99% cutoff should be interpreted in the context of clinical history, ECG findings and possibly cardiac imaging to establish correct diagnosis  o cTnI 99% cutoff may be suggestive but clearly not indicative of a coronary event without the clinical setting of myocardial ischemia  Basic metabolic panel [[id number]]  (Abnormal) Collected:  02/25/18 1536    Lab Status:  Final result Specimen:  Blood from Arm, Right Updated:  02/25/18 1557     Sodium 139 mmol/L      Potassium 4 2 mmol/L      Chloride 103 mmol/L      CO2 26 mmol/L      Anion Gap 10 mmol/L      BUN 25 mg/dL      Creatinine 1 60 (H) mg/dL      Glucose 146 (H) mg/dL      Calcium 8 9 mg/dL      eGFR 37 ml/min/1 73sq m     Narrative:         National Kidney Disease Education Program recommendations are as follows:  GFR calculation is accurate only with a steady state creatinine  Chronic Kidney disease less than 60 ml/min/1 73 sq  meters  Kidney failure less than 15 ml/min/1 73 sq  meters  Macario Sherri [[id number]]  (Normal) Collected:  02/25/18 1536    Lab Status:  Final result Specimen:  Blood from Arm, Right Updated:  02/25/18 1555     Protime 12 8 seconds      INR 0 94    APTT [[id number]]  (Normal) Collected:  02/25/18 1536    Lab Status:  Final result Specimen:  Blood from Arm, Right Updated:  02/25/18 1555     PTT 26 seconds     Narrative:          Therapeutic Heparin Range = 60-90 seconds    CBC and differential [[id number]] (Abnormal) Collected:  02/25/18 1536    Lab Status:  Final result Specimen:  Blood from Arm, Right Updated:  02/25/18 1549     WBC 6 40 Thousand/uL      RBC 4 07 Million/uL      Hemoglobin 12 5 g/dL      Hematocrit 38 2 %      MCV 94 fL      MCH 30 7 pg      MCHC 32 7 g/dL      RDW 13 5 %      MPV 12 9 (H) fL      Platelets 503 (L) Thousands/uL      Neutrophils Relative 71 %      Lymphocytes Relative 19 %      Monocytes Relative 8 %      Eosinophils Relative 1 %      Basophils Relative 1 %      Neutrophils Absolute 4 54 Thousands/µL      Lymphocytes Absolute 1 24 Thousands/µL      Monocytes Absolute 0 52 Thousand/µL      Eosinophils Absolute 0 07 Thousand/µL      Basophils Absolute 0 03 Thousands/µL     Fingerstick Glucose (POCT) [[id number]]  (Normal) Collected:  02/25/18 1512    Lab Status:  Final result Updated:  02/25/18 1513     POC Glucose 128 mg/dl                  CT head without contrast   Final Result by Ramo Pandey MD (02/25 1896)      1  No acute intracranial pathology  Workstation performed: [id number]                    Procedures  ECG 12 Lead Documentation  Date/Time: 2/25/2018 3:27 PM  Performed by: [employer]  Authorized by: [employer]     ECG reviewed by me, the ED Provider: yes    Patient location:  ED  Previous ECG:     Previous ECG:  Compared to current    Similarity:  Changes noted  Interpretation:     Interpretation: abnormal    Rate:     ECG rate:  63    ECG rate assessment: normal    Rhythm:     Rhythm: sinus rhythm    Ectopy:     Ectopy: none    QRS:     QRS axis:  Normal    QRS intervals: Wide  Conduction:     Conduction: abnormal      Abnormal conduction: complete RBBB and LAFB    ST segments:     ST segments:  Non-specific  T waves:     T waves: non-specific    Other findings:     Other findings: LVH             Phone Contacts  ED Phone Contact    ED Course  ED Course as of Feb 25 1622   Sun Feb 25, 2018   1617 Patient is stable for discharge    Is unclear as to the etiology of the syncopal episode  May have been vasovagal from abdominal discomfort that he has secondary to crampy  abdominal pain that began while in the emergency department  Family wishes not to pursue and admission in this 72-year-old family member  Discussed signs and symptoms that would require return to the emergency department  The patient and family members are comfortable with this decision making  MDM  CritCare Time    Disposition  Final diagnoses:   Syncope     Time reflects when diagnosis was documented in both MDM as applicable and the Disposition within this note     Time User Action Codes Description Comment    2/25/2018  4:20 PM Aravind Villa Add [R55] Syncope       ED Disposition     ED Disposition Condition Comment    Discharge  Mckenzie Cartagena discharge to home/self care  Condition at discharge: Stable        Follow-up Information     Follow up With Specialties Details [facility] Physician  Schedule an appointment as soon as possible for a visit          Patient's Medications   Discharge Prescriptions    No medications on file     No discharge procedures on file      ED Provider  Electronically Signed by           Antoni Raymond MD  02/25/18 0470

## 2018-02-25 NOTE — DISCHARGE INSTRUCTIONS
Syncope   WHAT YOU NEED TO KNOW:   Syncope is also called fainting or passing out  Syncope is a sudden, temporary loss of consciousness, followed by a fall from a standing or sitting position  Syncope ranges from not serious to a sign of a more serious condition that needs to be treated  You can control some health conditions that cause syncope  Your healthcare providers can help you create a plan to manage syncope and prevent episodes  DISCHARGE INSTRUCTIONS:   Seek care immediately if:   · You are bleeding because you hit your head when you fainted  · You suddenly have double vision, difficulty speaking, numbness, and cannot move your arms or legs  · You have chest pain and trouble breathing  · You vomit blood or material that looks like coffee grounds  · You see blood in your bowel movement  Contact your healthcare provider if:   · You have new or worsening symptoms  · You have another syncope episode  · You have a headache, fast heartbeat, or feel too dizzy to stand up  · You have questions or concerns about your condition or care  Follow up with your healthcare provider as directed:  Write down your questions so you remember to ask them during your visits  Manage syncope:   · Keep a record of your syncope episodes  Include your symptoms and your activity before and after the episode  The record can help your healthcare provider find the cause of your syncope and help you manage episodes  · Sit or lie down when needed  This includes when you feel dizzy, your throat is getting tight, and your vision changes  Raise your legs above the level of your heart  · Take slow, deep breaths if you start to breathe faster with anxiety or fear  This can help decrease dizziness and the feeling that you might faint  · Check your blood pressure often  This is important if you take medicine to lower your blood pressure   Check your blood pressure when you are lying down and when you are standing  Ask how often to check during the day  Keep a record of your blood pressure numbers  Your healthcare provider may use the record to help plan your treatment  Prevent a syncope episode:   · Move slowly and let yourself get used to one position before you move to another position  This is very important when you change from a lying or sitting position to a standing position  Take some deep breaths before you stand up from a lying position  Stand up slowly  Sudden movements may cause a fainting spell  Sit on the side of the bed or couch for a few minutes before you stand up  If you are on bedrest, try to be upright for about 2 hours each day, or as directed  Do not lock your legs if you are standing for a long period of time  Move your legs and bend your knees to keep blood flowing  · Follow your healthcare provider's recommendations  Your provider may  recommend that you drink more liquids to prevent dehydration  You may also need to have more salt to keep your blood pressure from dropping too low and causing syncope  Your provider will tell you how much liquid and sodium to have each day  · Watch for signs of low blood sugar  These include hunger, nervousness, sweating, and fast or fluttery heartbeats  Talk with your healthcare provider about ways to keep your blood sugar level steady  · Do not strain if you are constipated  You may faint if you strain to have a bowel movement  Walking is the best way to get your bowels moving  Eat foods high in fiber to make it easier to have a bowel movement  Good examples are high-fiber cereals, beans, vegetables, and whole-grain breads  Prune juice may help make bowel movements softer  · Be careful in hot weather  Heat can cause a syncope episode  Limit activity done outside on hot days  Physical activity in hot weather can lead to dehydration  This can cause an episode    © 2017 [employer] Information is for End User's use only and may not be sold, redistributed or otherwise used for commercial purposes  All illustrations and images included in CareNotes® are the copyrighted property of A D A M , Inc  or Earle Medrano  The above information is an  only  It is not intended as medical advice for individual conditions or treatments  Talk to your doctor, nurse or pharmacist before following any medical regimen to see if it is safe and effective for you  Syncope   AMBULATORY CARE:   Syncope  is also called fainting or passing out  Syncope is a sudden, temporary loss of consciousness, followed by a fall from a standing or sitting position  Syncope ranges from not serious to a sign of a more serious condition that needs to be treated  You can control some health conditions that cause syncope  Your healthcare providers can help you create a plan to manage syncope and prevent episodes  Signs and symptoms that may occur before syncope include the following:   · Cold, clammy, and sweaty skin     · Fast breathing and a racing, pounding heartbeat     · Feeling more tired than usual     · Nausea, a warm feeling, and sweating    · A headache, or feeling lightheaded or dizzy    · Tingling sensation or numbness     · Spots in front of your eyes, blurred vision, or double vision  Seek care immediately if:   · You are bleeding because you hit your head when you fainted  · You suddenly have double vision, difficulty speaking, numbness, and cannot move your arms or legs  · You have chest pain and trouble breathing  · You vomit blood or material that looks like coffee grounds  · You see blood in your bowel movement  Contact your healthcare provider if:   · You have new or worsening symptoms  · You have another syncope episode  · You have a headache, fast heartbeat, or feel too dizzy to stand up  · You have questions or concerns about your condition or care    Treatment for syncope  depends on the cause of your syncope  To prevent syncope from happening again, you may  need any of the following:  · Medicines  may be needed to treat any medical conditions that are causing your syncope  These may include medicines to help your heart pump strongly and regularly  Your healthcare provider may also make changes to any medicines that are causing syncope  · Tilt training  involves training yourself to stand for 10 to 30 minutes each day against a wall  This helps your body decrease the effects of posture changes and reduces the number of fainting spells  Manage syncope:   · Keep a record of your syncope episodes  Include your symptoms and your activity before and after the episode  The record can help your healthcare provider find the cause of your syncope and help you manage episodes  · Sit or lie down when needed  This includes when you feel dizzy, your throat is getting tight, and your vision changes  Raise your legs above the level of your heart  · Take slow, deep breaths if you start to breathe faster with anxiety or fear  This can help decrease dizziness and the feeling that you might faint  · Check your blood pressure often  This is important if you take medicine to lower your blood pressure  Check your blood pressure when you are lying down and when you are standing  Ask how often to check during the day  Keep a record of your blood pressure numbers  Your healthcare provider may use the record to help plan your treatment  Prevent a syncope episode:   · Move slowly and let yourself get used to one position before you move to another position  This is very important when you change from a lying or sitting position to a standing position  Take some deep breaths before you stand up from a lying position  Stand up slowly  Sudden movements may cause a fainting spell  Sit on the side of the bed or couch for a few minutes before you stand up   If you are on bedrest, try to be upright for about 2 hours each day, or as directed  Do not lock your legs if you are standing for a long period of time  Move your legs and bend your knees to keep blood flowing  · Follow your healthcare provider's recommendations  Your provider may  recommend that you drink more liquids to prevent dehydration  You may also need to have more salt to keep your blood pressure from dropping too low and causing syncope  Your provider will tell you how much liquid and sodium to have each day  · Watch for signs of low blood sugar  These include hunger, nervousness, sweating, and fast or fluttery heartbeats  Talk with your healthcare provider about ways to keep your blood sugar level steady  · Do not strain if you are constipated  You may faint if you strain to have a bowel movement  Walking is the best way to get your bowels moving  Eat foods high in fiber to make it easier to have a bowel movement  Good examples are high-fiber cereals, beans, vegetables, and whole-grain breads  Prune juice may help make bowel movements softer  · Be careful in hot weather  Heat can cause a syncope episode  Limit activity done outside on hot days  Physical activity in hot weather can lead to dehydration  This can cause an episode  Follow up with your healthcare provider as directed:  Write down your questions so you remember to ask them during your visits  © 2017 [address] Information is for End User's use only and may not be sold, redistributed or otherwise used for commercial purposes  All illustrations and images included in CareNotes® are the copyrighted property of A D A M , Inc  or Earle Medrano  The above information is an  only  It is not intended as medical advice for individual conditions or treatments  Talk to your doctor, nurse or pharmacist before following any medical regimen to see if it is safe and effective for you

## 2018-08-03 ENCOUNTER — APPOINTMENT (INPATIENT)
Dept: NON INVASIVE DIAGNOSTICS | Facility: HOSPITAL | Age: 83
DRG: 244 | End: 2018-08-03
Attending: INTERNAL MEDICINE
Payer: MEDICARE

## 2018-08-03 ENCOUNTER — HOSPITAL ENCOUNTER (INPATIENT)
Facility: HOSPITAL | Age: 83
LOS: 2 days | Discharge: HOME HEALTH | DRG: 244 | End: 2018-08-05
Attending: EMERGENCY MEDICINE | Admitting: FAMILY MEDICINE
Payer: MEDICARE

## 2018-08-03 ENCOUNTER — APPOINTMENT (INPATIENT)
Dept: RADIOLOGY | Facility: HOSPITAL | Age: 83
DRG: 244 | End: 2018-08-03
Payer: MEDICARE

## 2018-08-03 ENCOUNTER — APPOINTMENT (EMERGENCY)
Dept: NON INVASIVE DIAGNOSTICS | Facility: HOSPITAL | Age: 83
DRG: 244 | End: 2018-08-03
Payer: MEDICARE

## 2018-08-03 ENCOUNTER — APPOINTMENT (EMERGENCY)
Dept: RADIOLOGY | Facility: HOSPITAL | Age: 83
DRG: 244 | End: 2018-08-03
Payer: MEDICARE

## 2018-08-03 DIAGNOSIS — D50.9 IDA (IRON DEFICIENCY ANEMIA): ICD-10-CM

## 2018-08-03 DIAGNOSIS — I44.2 COMPLETE HEART BLOCK (HCC): Primary | ICD-10-CM

## 2018-08-03 DIAGNOSIS — Z86.73 HISTORY OF CVA (CEREBROVASCULAR ACCIDENT): ICD-10-CM

## 2018-08-03 DIAGNOSIS — N18.30 CHRONIC KIDNEY DISEASE, STAGE III (MODERATE) (HCC): ICD-10-CM

## 2018-08-03 DIAGNOSIS — Z97.8 CHRONIC INDWELLING FOLEY CATHETER: Chronic | ICD-10-CM

## 2018-08-03 PROBLEM — I10 ESSENTIAL HYPERTENSION: Status: ACTIVE | Noted: 2018-08-03

## 2018-08-03 LAB
ALBUMIN SERPL BCP-MCNC: 3.4 G/DL (ref 3.5–5)
ALP SERPL-CCNC: 65 U/L (ref 46–116)
ALT SERPL W P-5'-P-CCNC: 11 U/L (ref 12–78)
ANION GAP SERPL CALCULATED.3IONS-SCNC: 10 MMOL/L (ref 4–13)
APTT PPP: 22 SECONDS (ref 24–33)
AST SERPL W P-5'-P-CCNC: 25 U/L (ref 5–45)
ATRIAL RATE: 44 BPM
ATRIAL RATE: 44 BPM
ATRIAL RATE: 71 BPM
BASOPHILS # BLD AUTO: 0.03 THOUSANDS/ΜL (ref 0–0.1)
BASOPHILS NFR BLD AUTO: 1 % (ref 0–1)
BILIRUB SERPL-MCNC: 0.4 MG/DL (ref 0.2–1)
BUN SERPL-MCNC: 23 MG/DL (ref 5–25)
CALCIUM SERPL-MCNC: 8.9 MG/DL (ref 8.3–10.1)
CHLORIDE SERPL-SCNC: 101 MMOL/L (ref 100–108)
CO2 SERPL-SCNC: 22 MMOL/L (ref 21–32)
CREAT SERPL-MCNC: 1.46 MG/DL (ref 0.6–1.3)
EOSINOPHIL # BLD AUTO: 0.04 THOUSAND/ΜL (ref 0–0.61)
EOSINOPHIL NFR BLD AUTO: 1 % (ref 0–6)
ERYTHROCYTE [DISTWIDTH] IN BLOOD BY AUTOMATED COUNT: 14.3 % (ref 11.6–15.1)
GFR SERPL CREATININE-BSD FRML MDRD: 41 ML/MIN/1.73SQ M
GLUCOSE SERPL-MCNC: 163 MG/DL (ref 65–140)
HCT VFR BLD AUTO: 39.3 % (ref 36.5–49.3)
HGB BLD-MCNC: 12.6 G/DL (ref 12–17)
IMM GRANULOCYTES # BLD AUTO: 0.03 THOUSAND/UL (ref 0–0.2)
IMM GRANULOCYTES NFR BLD AUTO: 1 % (ref 0–2)
INR PPP: 0.95 (ref 0.86–1.16)
LYMPHOCYTES # BLD AUTO: 0.83 THOUSANDS/ΜL (ref 0.6–4.47)
LYMPHOCYTES NFR BLD AUTO: 15 % (ref 14–44)
MAGNESIUM SERPL-MCNC: 2.1 MG/DL (ref 1.6–2.6)
MCH RBC QN AUTO: 30.1 PG (ref 26.8–34.3)
MCHC RBC AUTO-ENTMCNC: 32.1 G/DL (ref 31.4–37.4)
MCV RBC AUTO: 94 FL (ref 82–98)
MONOCYTES # BLD AUTO: 0.4 THOUSAND/ΜL (ref 0.17–1.22)
MONOCYTES NFR BLD AUTO: 7 % (ref 4–12)
NEUTROPHILS # BLD AUTO: 4.38 THOUSANDS/ΜL (ref 1.85–7.62)
NEUTS SEG NFR BLD AUTO: 75 % (ref 43–75)
NRBC BLD AUTO-RTO: 0 /100 WBCS
NT-PROBNP SERPL-MCNC: 595 PG/ML
P AXIS: 43 DEGREES
P AXIS: 6 DEGREES
PLATELET # BLD AUTO: 158 THOUSANDS/UL (ref 149–390)
PMV BLD AUTO: 13.9 FL (ref 8.9–12.7)
POTASSIUM SERPL-SCNC: 4.2 MMOL/L (ref 3.5–5.3)
PROT SERPL-MCNC: 7.5 G/DL (ref 6.4–8.2)
PROTHROMBIN TIME: 10 SECONDS (ref 9.4–11.7)
QRS AXIS: -59 DEGREES
QRS AXIS: -60 DEGREES
QRS AXIS: -61 DEGREES
QRSD INTERVAL: 144 MS
QRSD INTERVAL: 144 MS
QRSD INTERVAL: 146 MS
QT INTERVAL: 516 MS
QT INTERVAL: 518 MS
QT INTERVAL: 518 MS
QTC INTERVAL: 432 MS
QTC INTERVAL: 441 MS
QTC INTERVAL: 442 MS
RBC # BLD AUTO: 4.19 MILLION/UL (ref 3.88–5.62)
SODIUM SERPL-SCNC: 133 MMOL/L (ref 136–145)
T WAVE AXIS: 50 DEGREES
T WAVE AXIS: 53 DEGREES
T WAVE AXIS: 57 DEGREES
TROPONIN I SERPL-MCNC: <0.02 NG/ML
TSH SERPL DL<=0.05 MIU/L-ACNC: 3.27 UIU/ML (ref 0.36–3.74)
VENTRICULAR RATE: 42 BPM
VENTRICULAR RATE: 44 BPM
VENTRICULAR RATE: 44 BPM
WBC # BLD AUTO: 5.71 THOUSAND/UL (ref 4.31–10.16)

## 2018-08-03 PROCEDURE — 87081 CULTURE SCREEN ONLY: CPT | Performed by: FAMILY MEDICINE

## 2018-08-03 PROCEDURE — 84484 ASSAY OF TROPONIN QUANT: CPT | Performed by: EMERGENCY MEDICINE

## 2018-08-03 PROCEDURE — 36415 COLL VENOUS BLD VENIPUNCTURE: CPT | Performed by: EMERGENCY MEDICINE

## 2018-08-03 PROCEDURE — 93010 ELECTROCARDIOGRAM REPORT: CPT | Performed by: INTERNAL MEDICINE

## 2018-08-03 PROCEDURE — 99285 EMERGENCY DEPT VISIT HI MDM: CPT

## 2018-08-03 PROCEDURE — 80053 COMPREHEN METABOLIC PANEL: CPT | Performed by: EMERGENCY MEDICINE

## 2018-08-03 PROCEDURE — 71045 X-RAY EXAM CHEST 1 VIEW: CPT

## 2018-08-03 PROCEDURE — 99223 1ST HOSP IP/OBS HIGH 75: CPT | Performed by: INTERNAL MEDICINE

## 2018-08-03 PROCEDURE — 93005 ELECTROCARDIOGRAM TRACING: CPT

## 2018-08-03 PROCEDURE — 02HK3JZ INSERTION OF PACEMAKER LEAD INTO RIGHT VENTRICLE, PERCUTANEOUS APPROACH: ICD-10-PCS | Performed by: INTERNAL MEDICINE

## 2018-08-03 PROCEDURE — 99220 PR INITIAL OBSERVATION CARE/DAY 70 MINUTES: CPT | Performed by: FAMILY MEDICINE

## 2018-08-03 PROCEDURE — 84443 ASSAY THYROID STIM HORMONE: CPT | Performed by: EMERGENCY MEDICINE

## 2018-08-03 PROCEDURE — 33207 INSERT HEART PM VENTRICULAR: CPT | Performed by: INTERNAL MEDICINE

## 2018-08-03 PROCEDURE — 93306 TTE W/DOPPLER COMPLETE: CPT

## 2018-08-03 PROCEDURE — 83735 ASSAY OF MAGNESIUM: CPT | Performed by: EMERGENCY MEDICINE

## 2018-08-03 PROCEDURE — 85610 PROTHROMBIN TIME: CPT | Performed by: EMERGENCY MEDICINE

## 2018-08-03 PROCEDURE — 83880 ASSAY OF NATRIURETIC PEPTIDE: CPT | Performed by: EMERGENCY MEDICINE

## 2018-08-03 PROCEDURE — C1898 LEAD, PMKR, OTHER THAN TRANS: HCPCS

## 2018-08-03 PROCEDURE — 0JH604Z INSERTION OF PACEMAKER, SINGLE CHAMBER INTO CHEST SUBCUTANEOUS TISSUE AND FASCIA, OPEN APPROACH: ICD-10-PCS | Performed by: INTERNAL MEDICINE

## 2018-08-03 PROCEDURE — C1786 PMKR, SINGLE, RATE-RESP: HCPCS

## 2018-08-03 PROCEDURE — 85025 COMPLETE CBC W/AUTO DIFF WBC: CPT | Performed by: EMERGENCY MEDICINE

## 2018-08-03 PROCEDURE — 94760 N-INVAS EAR/PLS OXIMETRY 1: CPT

## 2018-08-03 PROCEDURE — 33207 INSERT HEART PM VENTRICULAR: CPT

## 2018-08-03 PROCEDURE — 93306 TTE W/DOPPLER COMPLETE: CPT | Performed by: INTERNAL MEDICINE

## 2018-08-03 PROCEDURE — 85730 THROMBOPLASTIN TIME PARTIAL: CPT | Performed by: EMERGENCY MEDICINE

## 2018-08-03 RX ORDER — MIDAZOLAM HYDROCHLORIDE 1 MG/ML
INJECTION INTRAMUSCULAR; INTRAVENOUS CODE/TRAUMA/SEDATION MEDICATION
Status: COMPLETED | OUTPATIENT
Start: 2018-08-03 | End: 2018-08-03

## 2018-08-03 RX ORDER — LIDOCAINE HYDROCHLORIDE 10 MG/ML
INJECTION, SOLUTION INFILTRATION; PERINEURAL CODE/TRAUMA/SEDATION MEDICATION
Status: COMPLETED | OUTPATIENT
Start: 2018-08-03 | End: 2018-08-03

## 2018-08-03 RX ORDER — FENTANYL CITRATE 50 UG/ML
INJECTION, SOLUTION INTRAMUSCULAR; INTRAVENOUS CODE/TRAUMA/SEDATION MEDICATION
Status: COMPLETED | OUTPATIENT
Start: 2018-08-03 | End: 2018-08-03

## 2018-08-03 RX ORDER — FERROUS SULFATE 325(65) MG
325 TABLET ORAL
Status: DISCONTINUED | OUTPATIENT
Start: 2018-08-04 | End: 2018-08-05 | Stop reason: HOSPADM

## 2018-08-03 RX ORDER — POLYETHYLENE GLYCOL 3350 17 G/17G
17 POWDER, FOR SOLUTION ORAL DAILY
Status: DISCONTINUED | OUTPATIENT
Start: 2018-08-04 | End: 2018-08-05 | Stop reason: HOSPADM

## 2018-08-03 RX ORDER — DOCUSATE SODIUM 100 MG/1
200 CAPSULE, LIQUID FILLED ORAL DAILY
Status: DISCONTINUED | OUTPATIENT
Start: 2018-08-04 | End: 2018-08-05 | Stop reason: HOSPADM

## 2018-08-03 RX ORDER — ONDANSETRON 2 MG/ML
4 INJECTION INTRAMUSCULAR; INTRAVENOUS EVERY 6 HOURS PRN
Status: DISCONTINUED | OUTPATIENT
Start: 2018-08-03 | End: 2018-08-05 | Stop reason: HOSPADM

## 2018-08-03 RX ORDER — PANTOPRAZOLE SODIUM 40 MG/1
40 TABLET, DELAYED RELEASE ORAL DAILY
Status: DISCONTINUED | OUTPATIENT
Start: 2018-08-04 | End: 2018-08-05 | Stop reason: HOSPADM

## 2018-08-03 RX ORDER — ATORVASTATIN CALCIUM 40 MG/1
40 TABLET, FILM COATED ORAL EVERY EVENING
Status: DISCONTINUED | OUTPATIENT
Start: 2018-08-03 | End: 2018-08-05 | Stop reason: HOSPADM

## 2018-08-03 RX ORDER — LACTULOSE 20 G/30ML
10 SOLUTION ORAL DAILY
Status: DISCONTINUED | OUTPATIENT
Start: 2018-08-04 | End: 2018-08-05 | Stop reason: HOSPADM

## 2018-08-03 RX ORDER — ASPIRIN 81 MG/1
81 TABLET, CHEWABLE ORAL DAILY
Status: DISCONTINUED | OUTPATIENT
Start: 2018-08-04 | End: 2018-08-05 | Stop reason: HOSPADM

## 2018-08-03 RX ORDER — ACETAMINOPHEN 325 MG/1
650 TABLET ORAL EVERY 6 HOURS PRN
Status: DISCONTINUED | OUTPATIENT
Start: 2018-08-03 | End: 2018-08-05 | Stop reason: HOSPADM

## 2018-08-03 RX ORDER — LISINOPRIL 2.5 MG/1
2.5 TABLET ORAL 2 TIMES DAILY
Status: DISCONTINUED | OUTPATIENT
Start: 2018-08-03 | End: 2018-08-05 | Stop reason: HOSPADM

## 2018-08-03 RX ADMIN — LIDOCAINE HYDROCHLORIDE 15 ML: 10 INJECTION, SOLUTION INFILTRATION; PERINEURAL at 16:02

## 2018-08-03 RX ADMIN — LISINOPRIL 2.5 MG: 2.5 TABLET ORAL at 19:33

## 2018-08-03 RX ADMIN — CEFAZOLIN SODIUM 1000 MG: 1 SOLUTION INTRAVENOUS at 19:33

## 2018-08-03 RX ADMIN — ATORVASTATIN CALCIUM 40 MG: 40 TABLET, FILM COATED ORAL at 19:33

## 2018-08-03 RX ADMIN — CEFAZOLIN SODIUM 2000 MG: 1 SOLUTION INTRAVENOUS at 16:03

## 2018-08-03 RX ADMIN — FENTANYL CITRATE 25 MCG: 50 INJECTION, SOLUTION INTRAMUSCULAR; INTRAVENOUS at 16:05

## 2018-08-03 RX ADMIN — MIDAZOLAM HYDROCHLORIDE 0.5 MG: 1 INJECTION, SOLUTION INTRAMUSCULAR; INTRAVENOUS at 16:06

## 2018-08-03 NOTE — PROCEDURES
Cardiac Pacemaker Placement Operative Report    Brooklyn Novoa  [id number]  8/3/2018  Stacy Olson DO    Surgeon: Jess Montanez MD    Procedure(s): Single Chamber Permanent Pacemaker Implantation; Cardiac Fluoroscopy    Indications:  Syncope and episodes of complete heart block  Procedure Details: The risks, benefits, complications,  alternative treatment options, and expected outcomes were discussed with the patient  The complications of infection, pneumothorax, cardiac perforation, cardiac tamponade, and even death, but not limited to it were discussed with patient the family, before the patient was brought to cath lab  Patient and family were explained about lead dislodgement and need for repeat procedures  The patient and/or family concurred with the proposed plan, giving informed consent  Patient was prepped and draped in the usual strict sterile fashion  After the antibiotic was completely infused, 20 cc Sensorcaine was infiltrated into the area just medial to the left deltopectoral groove  Access was obtained in left subclavian vein with Seldinger technique and a guidewire was retained  Patient tolerated that procedure well no complication  Using a #15 scalpel, an incision was made  The incision was extended to the prepectoral fascia using blunt dissection as well as electrocautery  A small pocket was made and complete hemostasis was achieved  A antibiotic-soaked gauze was placed in the cavity  A guide wire was advanced to the heart under fluoroscopic guidance  A sheath was advanced over the guidewire  A pacemaker lead was advanced to the heart under fluoroscopic guidance  The sheath was peeled away  The lead was fixated to the right ventricular apex  Appropriate sensing and thresholds were obtained  No diaphragmatic pacing occurred at 10 V and 1 5 ms  The the lead was then sutured to the pectoralis muscle using silk sutures  A second suture was placed around the lead sleeves       The lead was attached to the generator  The system was placed in the pocket  The pacemaker and lead system was visualized under fluoroscopy  Appropriate redundancy/slack in the leads were noted  The pins of the lead was beyond the set screws  Hemostasis was reverified  The pocket was then closed with 2 running layers of the dissolvable sutures   Steri-Strips, a gauze dressing, and pressure dressing was placed over operative site  Pacemaker Data: Pacemaker is Medtronic Fanny XT SR MRI [id number], with a serial number RNA [id number]  Left ventricular lead is model [id number] with serial number [id number]    Measured Data: R-wave is 11 3 mV  Lead threshold is 0 75 at 0 04 millisecond, lead impedance is 722 to the device  Estimated Blood Loss:  Less than 5 cc         Complications: No complications    Disposition: Patient was transferred to holding area in stable condition  Condition: Stable    Impression: Successful placement of single chamber pacemaker without any complications  Plan see as ordered  Dr Ke Sanchez MD [facility]      "This note has been constructed using a voice recognition system  Therefore there may be syntax, spelling, and/or grammatical errors   Please call if you have any questions  "

## 2018-08-03 NOTE — CONSULTS
Consultation - Cardiology   Abbie Small 80 y o  male MRN: [id number]  Unit/Bed#: ED 06 Encounter: [id number]  08/03/18  12:36 PM          Physician Requesting Consult: Guillermina Prieto DO  Reason for Consult / Principal Problem:  Complete heart block      Assessment:  1  Complete heart block with symptoms of chest pain and shortness of breath  He has no obvious reversible cause of AV block and is likely secondary to sick sinus syndrome  Discussed with patient and his family in detail  His son was reluctant to proceed initially because of his father's advanced directives not wanting any advanced therapies  After consideration, he is willing to proceed  - Will plan for pacemaker today   - Dr Laith Rao in to see patient, consent obtained  - Monitor in ER until pacemaker inserted, afterwards, may be admitted to telemetry floor   - 2D echocardiogram  2  Previous CVA involving Right MCA - continue aspirin and atorvastatin  3  Hypertension  - continue lisinopril  4  Dyslipidemia  - continue atorvastatin    Plan:  As above      History of Present Illness   HPI: Abbie Small is a 80y o  year old male who presents with chest pain and shortness of breath  History obtained from patient along with his son and daughter-in-law at bedside  He is a poor historian due to dementia and difficulty hearing  Patient awoke this morning in his usual state health  He went back to sleep and afterwards developed chest pain and shortness of breath  He denies any syncope or near syncope  He denies lower extremity edema, orthopnea or paroxysmal nocturnal dyspnea  Upon ER, he was noted to be in sinus rhythm with complete heart block  He has a history of syncopal events with the most recent episode occurring in February 2018 for which she was seen in the emergency room of [facility]   Workup at that time was normal   He has no history of coronary artery disease or congestive heart failure    He has not seen a cardiologist previously  Review of Systems:    Review of Systems   Unable to perform ROS: Dementia       Historical Information   Past Medical History:   Diagnosis Date    BPH (benign prostatic hyperplasia)     GERD (gastroesophageal reflux disease)     Hernia     Hypertension     Neurogenic bladder     Chronic horn cath   Renal disorder     [facility]roke [facility])     Ulcer      Past Surgical History:   Procedure Laterality Date    BACK SURGERY      HERNIA REPAIR      HIP FUSION      JOINT REPLACEMENT      TRANSURETHRAL RESECTION OF BLADDER  09/20/2010    Transurethral resection of bladder neck    TRANSURETHRAL RESECTION OF PROSTATE  03/15/2010     History   Alcohol Use No     History   Drug Use No     History   Smoking Status    Never Smoker   Smokeless Tobacco    Never Used       Family History: History reviewed  No pertinent family history  Meds/Allergies   current meds:   No current facility-administered medications for this encounter  No Known Allergies    Objective   Vitals: Blood pressure 156/88, pulse (!) 52, temperature (!) 97 2 °F (36 2 °C), temperature source Oral, resp  rate 20, SpO2 100 %  , There is no height or weight on file to calculate BMI  Physical Exam   Constitutional: He appears healthy  No distress  HENT:   Nose: Nose normal    Mouth/Throat: Oropharynx is clear  Eyes: Conjunctivae are normal  Pupils are equal, round, and reactive to light  Neck: Neck supple  No JVD present  Cardiovascular: Regular rhythm  Bradycardia present  Exam reveals distant heart sounds  Exam reveals no friction rub  Murmur heard  Pulmonary/Chest: Effort normal and breath sounds normal  He has no wheezes  He has no rales  Abdominal: Soft  He exhibits no distension  There is no tenderness  Musculoskeletal: He exhibits no edema  Neurological: He is alert and oriented to person, place, and time  Skin: Skin is warm and dry  No rash noted         Lab Results:     Troponins: Results from last 7 days  Lab Units 18  1117   TROPONIN I ng/mL <0 02       CBC with diff:   Results from last 7 days  Lab Units 18  1117   WBC Thousand/uL 5 71   HEMOGLOBIN g/dL 12 6   HEMATOCRIT % 39 3   MCV fL 94   PLATELETS Thousands/uL 158   MCH pg 30 1   MCHC g/dL 32 1   RDW % 14 3   MPV fL 13 9*   NRBC AUTO /100 WBCs 0       CMP:   Results from last 7 days  Lab Units 18  1117   SODIUM mmol/L 133*   POTASSIUM mmol/L 4 2   CHLORIDE mmol/L 101   CO2 mmol/L 22   ANION GAP mmol/L 10   BUN mg/dL 23   CREATININE mg/dL 1 46*   GLUCOSE RANDOM mg/dL 163*   CALCIUM mg/dL 8 9   AST U/L 25   ALT U/L 11*   ALK PHOS U/L 65   TOTAL PROTEIN g/dL 7 5   BILIRUBIN TOTAL mg/dL 0 40   EGFR ml/min/1 73sq m 41       Magnesium:   Results from last 7 days  Lab Units 18  1117   MAGNESIUM mg/dL 2 1       Coags:   Results from last 7 days  Lab Units 18  1117   PTT seconds 22*   INR  0 95       Lipid Profile:         Cardiac testing:   Results for orders placed during the hospital encounter of 04/10/17   Echo complete with contrast if indicated    Narrative [address] [place] [address] [phone]    Transthoracic Echocardiogram  2D, M-mode, Doppler, and Color Doppler    Study date:  2017    Patient: Paolo Rivera  MR number: [id number]  Account number: [de-identified]  : 08-May-1926  Age: 80 years  Gender: Male  Status: Routine  Location: Bedside  Height: 59 in  Weight: 129 8 lb  BP: 142/ 64 mmHg    Indications: TIA    Diagnoses: I67 9 - Cerebrovascular disease, unspecified    Sonographer:  Pravin G Loyal Heimlich  Primary Physician:  Arnulfo Ferrari DO  Group:  Sejal Puckett  Interpreting Physician:  Amanuel Rodríguez MD    SUMMARY    LEFT VENTRICLE:  Systolic function was vigorous  Ejection fraction was estimated in the range of 65 % to 70 % to be 65 %    Although no diagnostic regional wall motion abnormality was identified, this possibility cannot be completely excluded on the basis of this study  Wall thickness was mildly increased  There was mild concentric hypertrophy  Doppler parameters were consistent with abnormal left ventricular relaxation (grade 1 diastolic dysfunction)  LEFT ATRIUM:  The atrium was mildly dilated  MITRAL VALVE:  There was mild annular calcification  There was mild regurgitation  AORTIC VALVE:  There was mild stenosis  TRICUSPID VALVE:  There was mild regurgitation  Estimated peak PA pressure was 45 mmHg  PULMONIC VALVE:  There was trace regurgitation  HISTORY: PRIOR HISTORY: HTN, GERD, BPH, Stroke    PROCEDURE: The procedure was performed at the bedside  This was a routine study  The transthoracic approach was used  The study included complete 2D imaging, M-mode, complete spectral Doppler, and color Doppler  The heart rate was 80 bpm,  at the start of the study  Image quality was adequate  LEFT VENTRICLE: Size was normal  Systolic function was vigorous  Ejection fraction was estimated in the range of 65 % to 70 % to be 65 %  Although no diagnostic regional wall motion abnormality was identified, this possibility cannot be  completely excluded on the basis of this study  Wall thickness was mildly increased  There was mild concentric hypertrophy  DOPPLER: Doppler parameters were consistent with abnormal left ventricular relaxation (grade 1 diastolic  dysfunction)  RIGHT VENTRICLE: The size was normal  Systolic function was normal     LEFT ATRIUM: The atrium was mildly dilated  RIGHT ATRIUM: Size was at the upper limits of normal     MITRAL VALVE: There was mild annular calcification  DOPPLER: There was no evidence for stenosis  There was mild regurgitation  AORTIC VALVE: The valve was probably trileaflet  Leaflets exhibited mildly increased thickness and mild calcification  DOPPLER: There was mild stenosis  There was no regurgitation  TRICUSPID VALVE: DOPPLER: There was mild regurgitation   Estimated peak PA pressure was 45 mmHg  PULMONIC VALVE: DOPPLER: There was trace regurgitation  PERICARDIUM: There was no thickening or calcification  There was no pericardial effusion  AORTA: The root exhibited normal size  SYSTEMIC VEINS: IVC: The inferior vena cava was normal in size  Respirophasic changes were normal     SYSTEM MEASUREMENT TABLES    2D mode  AoR Diam 2D: 3 5 cm  LA Diam (2D): 3 9 cm  LA/Ao (2D): 1 11  FS (2D Teich): 35 8 %  IVSd (2D): 1 42 cm  LVDEV: 69 6 cm³  LVESV: 23 7 cm³  LVIDd(2D): 3 99 cm  LVISd (2D): 2 56 cm  LVOT Area 2D: 2 84 cm squared  LVPWd (2D): 1 29 cm  SV (Teich): 45 9 cm³    Apical four chamber  LVEF A4C: 66 %    Unspecified Scan Mode  NERIS Cont Eq (Peak Demian): 1 55 cm squared  NERIS Cont Eq (VTI): 1 54 cm squared  LVOT (VTI): 21 2 cm  LVOT Diam : 1 9 cm  LVOT Vmax: 1150 mm/s  LVOT Vmax; Mean: 1210 mm/s  Peak Grad ; Mean: 6 mm[Hg]  SV (LVOT): 62 cm³  VTI;Mean: 3 mm[Hg]  MV Peak A Demian: 1580 mm/s  MV Peak E Demian   Mean: 987 mm/s  MVA (PHT): 3 28 cm squared  PHT: 67 ms  Max P mm[Hg]  V Max: 3170 mm/s  Vmax: 3170 mm/s  RA Area: 19 9 cm squared  RA Volume: 54 2 cm³  TAPSE: 2 4 cm    [facility] Accredited [facility]    Prepared and electronically signed by    Wojciech Dey MD  Signed 2017 17:14:42         EKG: Personally reviewed: Sinus rhythm with complete AV dissociation, RBBB and LAFB     Previous ECG: NSR with RBBB and LAFB    Imaging: I have personally reviewed pertinent films in PACS

## 2018-08-03 NOTE — ASSESSMENT & PLAN NOTE
Patient seen by cardiology and is s/p [employer] VVI pacemaker placement  As per Cardiology, pacemaker is functioning adequately  Patient to follow up Cardiology in 1 week    Pacemaker dressing to stay in place and patient's son advised to not get the dressing wet  Echo showed ejection fraction of 55 % with grade 1 diastolic dysfunction

## 2018-08-03 NOTE — ED PROVIDER NOTES
History  Chief Complaint   Patient presents with    Chest Pain     started about an hour ago  hx 3 strokes  89M hx dementia, CKD, HTN, CVA c/o chest pressure and SOB this AM, brought in by family, states it is still there but mild  Recent episode of syncope, had negative ED workup at that time by report  Prior to Admission Medications   Prescriptions Last Dose Informant Patient Reported? Taking?   aspirin 81 mg chewable tablet   No No   Sig: Chew 1 tablet daily for 30 days   atorvastatin (LIPITOR) 40 mg tablet   No No   Sig: Take 1 tablet by mouth every evening for 30 days   docusate sodium (COLACE) 100 mg capsule   Yes No   Sig: Take 200 mg by mouth daily   ferrous sulfate 325 (65 Fe) mg tablet   No No   Sig: Take 1 tablet by mouth 3 (three) times a day with meals for 30 days   Patient taking differently: Take 325 mg by mouth daily with breakfast     lactulose (CEPHULAC) 10 g packet   Yes No   Sig: Take 10 g by mouth daily in the early morning   lisinopril (ZESTRIL) 2 5 mg tablet   Yes No   Sig: Take 2 5 mg by mouth 2 (two) times a day  pantoprazole (PROTONIX) 40 mg tablet   Yes No   Sig: Take 40 mg by mouth daily   psyllium (METAMUCIL) 0 52 g capsule   Yes No   Sig: Take 0 52 g by mouth daily      Facility-Administered Medications: None       Past Medical History:   Diagnosis Date    BPH (benign prostatic hyperplasia)     GERD (gastroesophageal reflux disease)     Hernia     Hypertension     Neurogenic bladder     Chronic horn cath   Renal disorder     Stroke [facility])     Ulcer        Past Surgical History:   Procedure Laterality Date    BACK SURGERY      HERNIA REPAIR      HIP FUSION      JOINT REPLACEMENT      TRANSURETHRAL RESECTION OF BLADDER  09/20/2010    Transurethral resection of bladder neck    TRANSURETHRAL RESECTION OF PROSTATE  03/15/2010       History reviewed  No pertinent family history  I have reviewed and agree with the history as documented      Social History   Substance Use Topics    Smoking status: Never Smoker    Smokeless tobacco: Never Used    Alcohol use No        Review of Systems   Constitutional: Negative for fever  Respiratory: Negative for cough  Gastrointestinal: Negative for abdominal pain  Musculoskeletal: Negative for back pain  Neurological: Negative for headaches  All other systems reviewed and are negative  Physical Exam  Physical Exam   Constitutional: He is oriented to person, place, and time  He appears well-developed  HENT:   Mouth/Throat: Oropharynx is clear and moist    Eyes: Conjunctivae are normal    Neck: Neck supple  Cardiovascular:   bradycardic   Pulmonary/Chest: Effort normal and breath sounds normal  He has no rales  Abdominal: Soft  Bowel sounds are normal    Neurological: He is alert and oriented to person, place, and time  Skin: Skin is warm and dry  Psychiatric: He has a normal mood and affect  Vitals reviewed  Vital Signs  ED Triage Vitals [08/03/18 1055]   Temperature Pulse Respirations Blood Pressure SpO2   (!) 97 2 °F (36 2 °C) (!) 44 18 (!) 176/75 95 %      Temp Source Heart Rate Source Patient Position - Orthostatic VS BP Location FiO2 (%)   Oral Monitor Lying Right arm --      Pain Score       4           Vitals:    08/03/18 1230 08/03/18 1245 08/03/18 1300 08/03/18 1315   BP: (!) 176/93 (!) 192/88 (!) 172/79 166/78   Pulse: (!) 54 (!) 52 (!) 48 (!) 48   Patient Position - Orthostatic VS:           Visual Acuity      ED Medications  Medications - No data to display    Diagnostic Studies  Results Reviewed     Procedure Component Value Units Date/Time    TSH [[id number]]  (Normal) Collected:  08/03/18 1117    Lab Status:  Final result Specimen:  Blood from Arm, Right Updated:  08/03/18 1159     TSH [address] [place] [address] 3 267 uIU/mL     Narrative:         Patients undergoing fluorescein dye angiography may retain small amounts of fluorescein in the body for 48-72 hours post procedure   Samples containing fluorescein can produce falsely depressed TSH values  If the patient had this procedure,a specimen should be resubmitted post fluorescein clearance  Magnesium [[id number]]  (Normal) Collected:  08/03/18 1117    Lab Status:  Final result Specimen:  Blood from Arm, Right Updated:  08/03/18 1159     Magnesium 2 1 mg/dL     NT-BNP PRO [[id number]]  (Abnormal) Collected:  08/03/18 1117    Lab Status:  Final result Specimen:  Blood from Arm, Right Updated:  08/03/18 1159     NT-proBNP 595 (H) pg/mL     Comprehensive metabolic panel [[id number]]  (Abnormal) Collected:  08/03/18 1117    Lab Status:  Final result Specimen:  Blood from Arm, Right Updated:  08/03/18 1151     Sodium 133 (L) mmol/L      Potassium 4 2 mmol/L      Chloride 101 mmol/L      CO2 22 mmol/L      Anion Gap 10 mmol/L      BUN 23 mg/dL      Creatinine 1 46 (H) mg/dL      Glucose 163 (H) mg/dL      Calcium 8 9 mg/dL      AST 25 U/L      ALT 11 (L) U/L      Alkaline Phosphatase 65 U/L      Total Protein 7 5 g/dL      Albumin 3 4 (L) g/dL      Total Bilirubin 0 40 mg/dL      eGFR 41 ml/min/1 73sq m     Narrative:         National Kidney Disease Education Program recommendations are as follows:  GFR calculation is accurate only with a steady state creatinine  Chronic Kidney disease less than 60 ml/min/1 73 sq  meters  Kidney failure less than 15 ml/min/1 73 sq  meters      Troponin I [[id number]]  (Normal) Collected:  08/03/18 1117    Lab Status:  Final result Specimen:  Blood from Arm, Right Updated:  08/03/18 1150     Troponin I <0 02 ng/mL     Protime-INR [[id number]]  (Normal) Collected:  08/03/18 1117    Lab Status:  Final result Specimen:  Blood from Arm, Right Updated:  08/03/18 1140     Protime 10 0 seconds      INR 0 95    APTT [[id number]]  (Abnormal) Collected:  08/03/18 1117    Lab Status:  Final result Specimen:  Blood from Arm, Right Updated:  08/03/18 1140     PTT 22 (L) seconds     CBC and differential [[id number]]  (Abnormal) Collected: 08/03/18 1117    Lab Status:  Final result Specimen:  Blood from Arm, Right Updated:  08/03/18 1124     WBC 5 71 Thousand/uL      RBC 4 19 Million/uL      Hemoglobin 12 6 g/dL      Hematocrit 39 3 %      MCV 94 fL      MCH 30 1 pg      MCHC 32 1 g/dL      RDW 14 3 %      MPV 13 9 (H) fL      Platelets 169 Thousands/uL      nRBC 0 /100 WBCs      Neutrophils Relative 75 %      Immat GRANS % 1 %      Lymphocytes Relative 15 %      Monocytes Relative 7 %      Eosinophils Relative 1 %      Basophils Relative 1 %      Neutrophils Absolute 4 38 Thousands/µL      Immature Grans Absolute 0 03 Thousand/uL      Lymphocytes Absolute 0 83 Thousands/µL      Monocytes Absolute 0 40 Thousand/µL      Eosinophils Absolute 0 04 Thousand/µL      Basophils Absolute 0 03 Thousands/µL                  XR chest 1 view portable   Final Result by Gabi Bañuelos MD (08/03 1155)      No acute cardiopulmonary disease  Workstation performed: [id number]                    Procedures  Procedures       Phone Contacts  ED Phone Contact    ED Course                               MDM  Number of Diagnoses or Management Options  Complete heart block [facility]):   Diagnosis management comments: EKG shows complete heart block  Stable BP  Pt appears comfortable and mentating at baseline  Pacer pads applied  Discussed with Dr William Hunter and Dr Alex Dickens at bedside  Family agrees to [facility] placement today, then admit to tele under Dr Jackie Lion  The patient presented with a condition in which there was a high probability of imminent or life-threatening deterioration, and critical care services (excluding separately billable procedures) totalled 30-74 minutes          Disposition  Final diagnoses:   Complete heart block ([address] )     Time reflects when diagnosis was documented in both MDM as applicable and the Disposition within this note     Time User Action Codes Description Comment    8/3/2018 12:28 PM Clem Avery Add [I44 2] Complete heart toro [facility])       ED Disposition     ED Disposition Condition Comment    Admit  Case was discussed with Dr Andrew Weaver and the patient's admission status was agreed to be Admission Status: inpatient status to the service of Dr Andrew Weaver  Follow-up Information    None         Patient's Medications   Discharge Prescriptions    No medications on file     No discharge procedures on file      ED Provider  Electronically Signed by           Benjie Merlos DO  08/03/18 1277

## 2018-08-03 NOTE — ED NOTES
Pt bathed with chlorhexidine wipes  Belongings bagged and given to sumit Harper emptnory Arredondo RN  08/03/18 6871

## 2018-08-03 NOTE — H&P
History and Physical - [facility] Internal Medicine    Patient Information: Farhat Schofield 80 y o  male MRN: [id number]  Unit/Bed#: [address] [address] Encounter: [id number]  Admitting Physician: Austin Au DO  PCP: Kervin Che DO  Date of Admission:  08/03/18        Hospital Problem List:     Principal Problem:    Complete heart block ([address] )  Active Problems:    Essential hypertension    History of CVA (cerebrovascular accident)    Chronic kidney disease, stage III (moderate)    Hyperlipidemia    Chronic indwelling Harper catheter    GERRI (iron deficiency anemia)      Assessment/Plan:    * Complete heart block ([address] )   Assessment & Plan    Patient seen by cardiology and is s/p PPM placement  Monitor overnight on telemetry  Echo showed ejection fraction of 55 % with grade 1 diastolic dysfunction           History of CVA (cerebrovascular accident)   Assessment & Plan    History of prior right MCA CVA  Continue aspirin, statin        Essential hypertension   Assessment & Plan    Continue lisinopril        GERRI (iron deficiency anemia)   Assessment & Plan    Continue ferrous sulfate        Chronic indwelling Harper catheter   Assessment & Plan    Due to neurogenic bladder  Continue Harper        Hyperlipidemia   Assessment & Plan    Continue statin        Chronic kidney disease, stage III (moderate)   Assessment & Plan    Creatinine appears at her baseline                VTE Prophylaxis: Pharmacologic VTE Prophylaxis contraindicated due to PPM placement  / sequential compression device   Code Status: Level 3 - DNAR and DNI    Anticipated Length of Stay:  Patient will be admitted on an Observation basis with an anticipated length of stay of 1 midnights  Justification for Hospital Stay: Complete heart block    Total Time for Visit, including Counseling / Coordination of Care: 45 minutes  Greater than 50% of this total time spent on direct patient counseling and coordination of care      Chief Complaint:     Chest Pain (started about an hour ago  hx 3 strokes   )    of Present Illness:    Rachel Armstrong is a 80 y o  male who presents with complaints of chest pain  Patient is a poor historian and most of the history was obtained by reviewing epic notes and by speaking with his son over the phone  Patient reported chest pain earlier this morning  He then went to have a bowel movement and when he got back and sat down on the recliner he still had chest pain and also states that his left arm got numb  He states that he did not feel right  He had a syncopal episode in February of 2018 where he passed out for 30 minutes at [employer]  In The ER, EKG showed complete heart block and patient had pacemaker placed    Review of Systems:    Review of Systems   Constitutional: Negative for appetite change, chills and fever  HENT: Negative for congestion, sore throat and trouble swallowing  Eyes: Negative for photophobia and visual disturbance  Respiratory: Negative for cough and shortness of breath  Cardiovascular: Positive for chest pain  Negative for palpitations and leg swelling  Gastrointestinal: Negative for abdominal pain, diarrhea, nausea and vomiting  Genitourinary: Negative for dysuria, frequency and hematuria  Musculoskeletal: Negative for back pain  Skin: Negative for wound  Neurological: Negative for dizziness, weakness, light-headedness and headaches  Hematological: Does not bruise/bleed easily  Psychiatric/Behavioral: Negative for agitation  Past Medical and Surgical History:     Past Medical History:   Diagnosis Date    BPH (benign prostatic hyperplasia)     GERD (gastroesophageal reflux disease)     Hernia     Hypertension     Neurogenic bladder     Chronic horn cath       Renal disorder     [facility]     Ulcer        Past Surgical History:   Procedure Laterality Date    BACK SURGERY      HERNIA REPAIR      HIP FUSION      JOINT REPLACEMENT      TRANSURETHRAL RESECTION OF BLADDER  09/20/2010    Transurethral resection of bladder neck    TRANSURETHRAL RESECTION OF PROSTATE  03/15/2010       Meds/Allergies:    PTA meds:   Prior to Admission Medications   Prescriptions Last Dose Informant Patient Reported? Taking?   aspirin 81 mg chewable tablet   No No   Sig: Chew 1 tablet daily for 30 days   atorvastatin (LIPITOR) 40 mg tablet   No No   Sig: Take 1 tablet by mouth every evening for 30 days   docusate sodium (COLACE) 100 mg capsule 8/3/2018 at Unknown time  Yes Yes   Sig: Take 200 mg by mouth daily   ferrous sulfate 325 (65 Fe) mg tablet   No No   Sig: Take 1 tablet by mouth 3 (three) times a day with meals for 30 days   Patient taking differently: Take 325 mg by mouth daily with breakfast     lactulose (CEPHULAC) 10 g packet   Yes No   Sig: Take 10 g by mouth daily in the early morning   lisinopril (ZESTRIL) 2 5 mg tablet   Yes No   Sig: Take 2 5 mg by mouth 2 (two) times a day  pantoprazole (PROTONIX) 40 mg tablet   Yes No   Sig: Take 40 mg by mouth daily   psyllium (METAMUCIL) 0 52 g capsule   Yes No   Sig: Take 0 52 g by mouth daily      Facility-Administered Medications: None       Allergies: No Known Allergies  History:     Marital Status:      Substance Use History:   History   Alcohol Use No     History   Smoking Status    Never Smoker   Smokeless Tobacco    Never Used     History   Drug Use No       Family History:    History reviewed  No pertinent family history  Physical Exam:     Vitals:   Blood Pressure: 162/77 (08/03/18 1659)  Pulse: 57 (08/03/18 1659)  Temperature: 98 8 °F (37 1 °C) (08/03/18 1659)  Temp Source: Oral (08/03/18 1659)  Respirations: 20 (08/03/18 1659)  Weight - Scale: 54 4 kg (120 lb) (08/03/18 1659)  SpO2: 99 % (08/03/18 1749)    Physical Exam   Constitutional: He appears well-developed and well-nourished  No distress  HENT:   Head: Normocephalic and atraumatic     Extremely hard of hearing   Eyes: EOM are normal  Right eye exhibits no discharge  Left eye exhibits no discharge  No scleral icterus  Neck: Neck supple  Cardiovascular: Normal rate and regular rhythm  Murmur heard  Pulmonary/Chest: Effort normal and breath sounds normal  No respiratory distress  He has no wheezes  He has no rales  (+) dressing noted on left side of the chest   Abdominal: Soft  Bowel sounds are normal  He exhibits no distension  There is no tenderness  Genitourinary:   Genitourinary Comments: Harper in place   Musculoskeletal: He exhibits no edema  Neurological: He is alert  No cranial nerve deficit  Oriented to self and place  Thinks its 1980   Skin: He is not diaphoretic  Psychiatric: Cognition and memory are impaired  Lab Results: I have personally reviewed pertinent reports  Results from last 7 days  Lab Units 08/03/18  1117   WBC Thousand/uL 5 71   HEMOGLOBIN g/dL 12 6   HEMATOCRIT % 39 3   PLATELETS Thousands/uL 158   NEUTROS PCT % 75   LYMPHS PCT % 15   MONOS PCT % 7   EOS PCT % 1       Results from last 7 days  Lab Units 08/03/18  1117   SODIUM mmol/L 133*   POTASSIUM mmol/L 4 2   CHLORIDE mmol/L 101   CO2 mmol/L 22   BUN mg/dL 23   CREATININE mg/dL 1 46*   CALCIUM mg/dL 8 9   TOTAL PROTEIN g/dL 7 5   BILIRUBIN TOTAL mg/dL 0 40   ALK PHOS U/L 65   ALT U/L 11*   AST U/L 25   GLUCOSE RANDOM mg/dL 163*       Results from last 7 days  Lab Units 08/03/18  1117   INR  0 95       Imaging: I have personally reviewed pertinent reports  Xr Chest Portable    Result Date: 8/3/2018  Narrative: CHEST INDICATION:   Patient s/p Pacemaker/ICD Insertion  COMPARISON:  Prior chest x-ray performed earlier the same day  EXAM PERFORMED/VIEWS:  XR CHEST PORTABLE FINDINGS:  Left-sided chest wall pacemaker is identified  Pacemaker leads are intact  Heart shadow is enlarged but unchanged from prior exam  The lungs are clear  No pneumothorax or pleural effusion  Osseous structures appear within normal limits for patient age       Impression: Stable cardiomegaly  No acute cardiopulmonary disease  Workstation performed: [id number]     Xr Chest 1 View Portable    Result Date: 8/3/2018  Narrative: CHEST INDICATION:   cp  COMPARISON:  04/10/2017 EXAM PERFORMED/VIEWS:  XR CHEST PORTABLE 1 image FINDINGS: Cardiomediastinal silhouette appears enlarged  The pulmonary vessels are normal  The lungs are clear  No pneumothorax or pleural effusion  Osseous structures appear within normal limits for patient age  Impression: No acute cardiopulmonary disease  Workstation performed: [id number]       XR chest portable   Final Result      Stable cardiomegaly  No acute cardiopulmonary disease  Workstation performed: [id number]         XR chest 1 view portable   Final Result      No acute cardiopulmonary disease  Workstation performed: [id number]         XR chest 2 views    (Results Pending)       EKG, Pathology, and Other Studies Reviewed on Admission:   · EKG shows complete heart block    Epic Records Reviewed: Yes     ** Please Note: Dragon 360 Dictation voice to text software may have been used in the creation of this document   **

## 2018-08-03 NOTE — ED NOTES
Family meeting with cardiologist to determine if pt wants pacemaker surgery         Rafi Grace RN  08/03/18 9539

## 2018-08-04 ENCOUNTER — APPOINTMENT (OUTPATIENT)
Dept: RADIOLOGY | Facility: HOSPITAL | Age: 83
DRG: 244 | End: 2018-08-04
Payer: MEDICARE

## 2018-08-04 LAB
ANION GAP SERPL CALCULATED.3IONS-SCNC: 8 MMOL/L (ref 4–13)
BUN SERPL-MCNC: 22 MG/DL (ref 5–25)
CALCIUM SERPL-MCNC: 8.2 MG/DL (ref 8.3–10.1)
CHLORIDE SERPL-SCNC: 103 MMOL/L (ref 100–108)
CO2 SERPL-SCNC: 26 MMOL/L (ref 21–32)
CREAT SERPL-MCNC: 1.33 MG/DL (ref 0.6–1.3)
ERYTHROCYTE [DISTWIDTH] IN BLOOD BY AUTOMATED COUNT: 14.1 % (ref 11.6–15.1)
GFR SERPL CREATININE-BSD FRML MDRD: 46 ML/MIN/1.73SQ M
GLUCOSE P FAST SERPL-MCNC: 96 MG/DL (ref 65–99)
GLUCOSE SERPL-MCNC: 173 MG/DL (ref 65–140)
GLUCOSE SERPL-MCNC: 96 MG/DL (ref 65–140)
HCT VFR BLD AUTO: 36.8 % (ref 36.5–49.3)
HGB BLD-MCNC: 11.7 G/DL (ref 12–17)
MAGNESIUM SERPL-MCNC: 2 MG/DL (ref 1.6–2.6)
MCH RBC QN AUTO: 29.7 PG (ref 26.8–34.3)
MCHC RBC AUTO-ENTMCNC: 31.8 G/DL (ref 31.4–37.4)
MCV RBC AUTO: 93 FL (ref 82–98)
PLATELET # BLD AUTO: 122 THOUSANDS/UL (ref 149–390)
PMV BLD AUTO: 13.6 FL (ref 8.9–12.7)
POTASSIUM SERPL-SCNC: 4.4 MMOL/L (ref 3.5–5.3)
RBC # BLD AUTO: 3.94 MILLION/UL (ref 3.88–5.62)
SODIUM SERPL-SCNC: 137 MMOL/L (ref 136–145)
TSH SERPL DL<=0.05 MIU/L-ACNC: 1.58 UIU/ML (ref 0.36–3.74)
WBC # BLD AUTO: 6.2 THOUSAND/UL (ref 4.31–10.16)

## 2018-08-04 PROCEDURE — G8987 SELF CARE CURRENT STATUS: HCPCS

## 2018-08-04 PROCEDURE — 82948 REAGENT STRIP/BLOOD GLUCOSE: CPT

## 2018-08-04 PROCEDURE — 80048 BASIC METABOLIC PNL TOTAL CA: CPT | Performed by: INTERNAL MEDICINE

## 2018-08-04 PROCEDURE — 71046 X-RAY EXAM CHEST 2 VIEWS: CPT

## 2018-08-04 PROCEDURE — 83735 ASSAY OF MAGNESIUM: CPT | Performed by: FAMILY MEDICINE

## 2018-08-04 PROCEDURE — 99024 POSTOP FOLLOW-UP VISIT: CPT | Performed by: INTERNAL MEDICINE

## 2018-08-04 PROCEDURE — 99232 SBSQ HOSP IP/OBS MODERATE 35: CPT | Performed by: FAMILY MEDICINE

## 2018-08-04 PROCEDURE — 84443 ASSAY THYROID STIM HORMONE: CPT | Performed by: FAMILY MEDICINE

## 2018-08-04 PROCEDURE — G8988 SELF CARE GOAL STATUS: HCPCS

## 2018-08-04 PROCEDURE — 85027 COMPLETE CBC AUTOMATED: CPT | Performed by: INTERNAL MEDICINE

## 2018-08-04 PROCEDURE — 97167 OT EVAL HIGH COMPLEX 60 MIN: CPT

## 2018-08-04 RX ADMIN — LISINOPRIL 2.5 MG: 2.5 TABLET ORAL at 08:52

## 2018-08-04 RX ADMIN — ASPIRIN 81 MG 81 MG: 81 TABLET ORAL at 08:52

## 2018-08-04 RX ADMIN — LACTULOSE 10 G: 20 SOLUTION ORAL at 08:51

## 2018-08-04 RX ADMIN — ATORVASTATIN CALCIUM 40 MG: 40 TABLET, FILM COATED ORAL at 17:24

## 2018-08-04 RX ADMIN — PANTOPRAZOLE SODIUM 40 MG: 40 TABLET, DELAYED RELEASE ORAL at 08:52

## 2018-08-04 RX ADMIN — DOCUSATE SODIUM 200 MG: 100 CAPSULE, LIQUID FILLED ORAL at 08:52

## 2018-08-04 RX ADMIN — POLYETHYLENE GLYCOL 3350 17 G: 17 POWDER, FOR SOLUTION ORAL at 10:17

## 2018-08-04 RX ADMIN — Medication 325 MG: at 08:52

## 2018-08-04 RX ADMIN — LISINOPRIL 2.5 MG: 2.5 TABLET ORAL at 17:24

## 2018-08-04 RX ADMIN — CEFAZOLIN SODIUM 1000 MG: 1 SOLUTION INTRAVENOUS at 05:32

## 2018-08-04 RX ADMIN — CEFAZOLIN SODIUM 1000 MG: 1 SOLUTION INTRAVENOUS at 16:32

## 2018-08-04 NOTE — PHYSICAL THERAPY NOTE
PT eval orders received  Chart reviewed  Pt reporting "I just got back into bed    I want to take a nap now" Pt declining Pt at this time due to fatigue  Will re-attempt as able  Pt was up earlier with OT

## 2018-08-04 NOTE — CASE MANAGEMENT
Initial Clinical Review    Admission: Date/Time/Statement: 8/3/18 @ 1837  Orders Placed This Encounter    Place in Observation     Standing Status:   Standing     Number of Occurrences:   1     Order Specific Question:   Admitting Physician     Answer:   Celeste De Leon     Order Specific Question:   Level of Care     Answer:   Med Surg [16]         ED: Date/Time/Mode of Arrival:   ED Arrival Information     Expected Arrival [address] [place] of Arrival Escorted By Service Admission Type    - 8/3/2018 10:46 Emergent [address]  [facility] Emergency    Arrival Complaint    chest pain          Chief Complaint:   Chief Complaint   Patient presents with    Chest Pain     started about an hour ago  hx 3 strokes  History of Illness:   Sophia Zaldivar is a 80 y o  male who presents with complaints of chest pain  Patient is a poor historian and most of the history was obtained by reviewing epic notes and by speaking with his son over the phone  Patient reported chest pain earlier this morning  He then went to have a bowel movement and when he got back and sat down on the recliner he still had chest pain and also states that his left arm got numb  He states that he did not feel right      ED Vital Signs:   ED Triage Vitals [08/03/18 1055]   Temperature Pulse Respirations Blood Pressure SpO2   (!) 97 2 °F (36 2 °C) (!) 44 18 (!) 176/75 95 %      Temp Source Heart Rate Source Patient Position - Orthostatic VS BP Location FiO2 (%)   Oral Monitor Lying Right arm --      Pain Score       4        Wt Readings from Last 1 Encounters:   08/03/18 54 4 kg (120 lb)     Abnormal Labs/Diagnostic Test Results:   WBC Thousand/uL 5 71   HEMOGLOBIN g/dL 12 6   HEMATOCRIT % 39 3   PLATELETS Thousands/uL 158     SODIUM mmol/L 133*   POTASSIUM mmol/L 4 2   CHLORIDE mmol/L 101   CO2 mmol/L 22   BUN mg/dL 23   CREATININE mg/dL 1 46*   CALCIUM mg/dL 8 9   TOTAL PROTEIN g/dL 7 5   BILIRUBIN TOTAL mg/dL 0 40 ALK PHOS U/L 65   ALT U/L 11*   AST U/L 25   GLUCOSE RANDOM mg/dL 163*     INR   0 95     Chest X:  Stable cardiomegaly  No acute cardiopulmonary disease    ECG:  CHB  08/03/2018  Single Chamber Permanent Pacemaker Implantation; Cardiac Fluoroscopy    ED Treatment:   Medication Administration from 08/03/2018 1046 to 08/03/2018 1655       Date/Time Order Dose Route Action Comments     08/03/2018 1602 lidocaine (XYLOCAINE) 1 % injection 15 mL Infiltration Given      08/03/2018 1603 ceFAZolin (ANCEF) IVPB (premix) 2,000 mg Intravenous Given per protocol     08/03/2018 1605 fentanyl citrate (PF) 100 MCG/2ML 25 mcg Intravenous Given procedural comfort  08/03/2018 1606 midazolam (VERSED) injection 0 5 mg Intravenous Given procedural comfort  Past Medical/Surgical History:    Active Ambulatory Problems     Diagnosis Date Noted    Acute pulmonary embolism ([facility] ) 02/01/2016    Unilateral inguinal hernia without obstruction or gangrene 02/01/2016    Chronic kidney disease, stage III (moderate) 02/01/2016    Accelerated hypertension 04/11/2017    [facility]OS [facility] ) 04/11/2017    Altered mental status 04/11/2017    Slurred speech 04/11/2017    Pyuria 04/11/2017    Anemia 04/11/2017    Hypoalbuminemia 04/11/2017    Hyperlipidemia 04/11/2017    Chronic indwelling Harper catheter 04/12/2017    Urinary tract infection associated with indwelling urethral catheter ([facility] ) 04/12/2017    GERRI (iron deficiency anemia) 04/12/2017     Past Medical History:   Diagnosis Date    BPH (benign prostatic hyperplasia)     GERD (gastroesophageal reflux disease)     Hernia     Hypertension     Neurogenic bladder     Renal disorder     Stroke ([facility] )     Ulcer        Admitting Diagnosis: Complete heart block ([facility] ) [I44 2]  Chest pain [R07 9]    Age/Sex: 80 y o  male    Assessment/Plan:   Principal Problem:    Complete heart block (HCC)  Active Problems:    Essential hypertension    History of CVA (cerebrovascular accident)    Chronic kidney disease, stage III (moderate)    Hyperlipidemia    Chronic indwelling Harper catheter    GERRI (iron deficiency anemia)        Assessment/Plan:   * Complete heart block ([address] )   Assessment & Plan     Patient seen by cardiology and is s/p PPM placement  Monitor overnight on telemetry  Echo showed ejection fraction of 55 % with grade 1 diastolic dysfunction             History of CVA (cerebrovascular accident)   Assessment & Plan     History of prior right MCA CVA  Continue aspirin, statin          Essential hypertension   Assessment & Plan     Continue lisinopril          GERRI (iron deficiency anemia)   Assessment & Plan     Continue ferrous sulfate          Chronic indwelling Harper catheter   Assessment & Plan     Due to neurogenic bladder  Continue Harper          Hyperlipidemia   Assessment & Plan     Continue statin          Chronic kidney disease, stage III (moderate)   Assessment & Plan     Creatinine appears at her baseline              VTE Prophylaxis: Pharmacologic VTE Prophylaxis contraindicated due to PPM placement  / mark sequential compression device      Anticipated Length of Stay:  Patient will be admitted on an Observation basis with an anticipated length of stay of 1 midnights     Justification for Hospital Stay: Complete heart block         Admission Orders:  Scheduled Meds:   Current Facility-Administered Medications:  acetaminophen 650 mg Oral Q6H PRN Alyssa Revankar, DO    aspirin 81 mg Oral Daily Alyssa Revankar, DO    atorvastatin 40 mg Oral QPM Alyssavickey Castelan, DO    cefazolin 1,000 mg Intravenous Q12H Suzette Martínez MD Last Rate: 1,000 mg (08/04/18 0532)   docusate sodium 200 mg Oral Daily Alyssa Revankar, DO    ferrous sulfate 325 mg Oral Daily With Breakfast Alyssa Revgrover, DO    lactulose 10 g Oral Daily Alyssa Revankar, DO    lisinopril 2 5 mg Oral BID Alyssa Revankar, DO    ondansetron 4 mg Intravenous Q6H PRN Alyssa Revankar, DO    pantoprazole 40 mg Oral Daily Alyssa Revankar, DO    polyethylene glycol 17 g Oral Daily Alyssa Revankar, DO      Elevate HOB  Do not roll on affected side  TELM  Consult PT/OT

## 2018-08-04 NOTE — PROGRESS NOTES
Progress Note - Cardiology   [facility]     Carolina Fall 80 y o  male MRN: [id number]  : 1926  Unit/Bed#: [address] [address] Encounter: [id number]    Assessment and Plan:   1  Syncope with intermittent complete heart block status post Medtronic single-chamber pacemaker  Which is functioning adequately  2   History of previous CVA  On aspirin and atorvastatin  Can resume aspirin  3  Dyslipidemia on statin  4  Essential hypertension  On lisinopril acceptable blood pressure  5  History of urinary tract obstruction on chronic Harper catheter and recurrent UTI  6  Chronic abdominal pain  7  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 with patient using walker  Need PT OT  Plan  Continue current Rx  Continue IV antibiotics  Patient had Medtronic VVI pacemaker placed as family want to do the minimum necessary procedure  So far ITs rate is set as 55 beats per minute to avoid competitive pacing  Pacemaker functioning adequate  Continue monitoring overnight   Possible discharge tomorrow    Subjective / Objective:   Patient seen and evaluated  Pacemaker dressing is removed  Pacemaker site has no swelling no redness  Patient complaining about some abdominal discomfort  No nausea no vomiting no other significant complaint  Pacemaker rate is deliberately set at 55 beats per minute to avoid competitive pacing  Vitals: Blood pressure 147/65, pulse 56, temperature 99 °F (37 2 °C), temperature source Oral, resp  rate 20, weight 54 4 kg (120 lb), SpO2 96 %  Vitals:    18 1659   Weight: 54 4 kg (120 lb)     Body mass index is 24 24 kg/m²    BP Readings from Last 3 Encounters:   18 147/65   18 121/57   17 116/82     Orthostatic Blood Pressures      Most Recent Value   Blood Pressure  147/65 filed at 2018 0755   Patient Position - Orthostatic VS  Lying filed at 2018 9396        I/O        07 -  0700  07 -  07 0701 - 08/05 0700    Urine (mL/kg/hr)  340     Total Output   340      Net   -340                 Invasive Devices     Peripheral Intravenous Line            Peripheral IV 08/03/18 Right Antecubital less than 1 day          Drain            Urethral Catheter 16 Fr  864 days    Urethral Catheter 1 day                  Intake/Output Summary (Last 24 hours) at 08/04/18 1107  Last data filed at 08/04/18 0532   Gross per 24 hour   Intake                0 ml   Output              340 ml   Net             -340 ml         Physical Exam:   Physical Exam    Neurologic:  Alert & oriented x 3,  no focal deficits noted   Constitutional:  Well developed, well nourished,  With no acute distress  Eyes:  PERRL, conjunctiva normal   HENT:  Atraumatic, external ears normal, nose normal,   NECK: Normal range of motion, no tenderness, neck is supple , No JVP  Respiratory:  Bilateral air entry mostly clear to auscultation  Cardiovascular: S1-S2 regular with a 2/6 ejection systolic murmur  S4 is heard  GI:  Soft, nondistended, normal bowel sounds, nontender, no hepatosplenomegaly appreciated  Musculoskeletal:  No tenderness, no deformities  Extremities:  no edema   Pacemaker site is clean    Patient has chronic indwelling Harper catheter              Medications/ Allergies:     Current Facility-Administered Medications:  acetaminophen 650 mg Oral Q6H PRN Alyssa Revankar, DO    aspirin 81 mg Oral Daily Alyssa Revankar, DO    atorvastatin 40 mg Oral QPM Alyssa Revankar, DO    cefazolin 1,000 mg Intravenous Q12H Ke Sanchez MD Last Rate: 1,000 mg (08/04/18 0532)   docusate sodium 200 mg Oral Daily Alyssa Revankar, DO    ferrous sulfate 325 mg Oral Daily With Breakfast Alyssa Revankar, DO    lactulose 10 g Oral Daily Alyssa Revankar, DO    lisinopril 2 5 mg Oral BID Alyssa Revankar, DO    ondansetron 4 mg Intravenous Q6H PRN Alyssa Revankar, DO    pantoprazole 40 mg Oral Daily Alyssa Revankar, DO    polyethylene glycol 17 g Oral Daily Alyssa Munroear, DO        acetaminophen 650 mg Q6H PRN   ondansetron 4 mg Q6H PRN     No Known Allergies        Labs:   Troponins:  Results from last 7 days  Lab Units 08/03/18  1117   TROPONIN I ng/mL <0 02       CBC with diff:  Results from last 7 days  Lab Units 08/04/18  0540 08/03/18  1117   WBC Thousand/uL 6 20 5 71   HEMOGLOBIN g/dL 11 7* 12 6   HEMATOCRIT % 36 8 39 3   MCV fL 93 94   PLATELETS Thousands/uL 122* 158   MCH pg 29 7 30 1   MCHC g/dL 31 8 32 1   RDW % 14 1 14 3   MPV fL 13 6* 13 9*   NRBC AUTO /100 WBCs  --  0       CMP:  Results from last 7 days  Lab Units 08/04/18  0540 08/03/18  1117   SODIUM mmol/L 137 133*   POTASSIUM mmol/L 4 4 4 2   CHLORIDE mmol/L 103 101   CO2 mmol/L 26 22   ANION GAP mmol/L 8 10   BUN mg/dL 22 23   CREATININE mg/dL 1 33* 1 46*   GLUCOSE RANDOM mg/dL 96 163*   CALCIUM mg/dL 8 2* 8 9   AST U/L  --  25   ALT U/L  --  11*   ALK PHOS U/L  --  65   TOTAL PROTEIN g/dL  --  7 5   BILIRUBIN TOTAL mg/dL  --  0 40   EGFR ml/min/1 73sq m 46 41       Magnesium:  Results from last 7 days  Lab Units 08/04/18  0540 08/03/18  1117   MAGNESIUM mg/dL 2 0 2 1     Coags:  Results from last 7 days  Lab Units 08/03/18  1117   PTT seconds 22*   INR  0 95     TSH:  Results from last 7 days  Lab Units 08/04/18  0540 08/03/18  1117   TSH 3RD GENERATON uIU/mL 1 581 3 267     NT-proBNP:   Recent Labs      08/03/18   1117   NTBNP  595*        Imaging & Testing   I have personally reviewed pertinent reports  Xr Chest Portable    Result Date: 8/3/2018  Narrative: CHEST INDICATION:   Patient s/p Pacemaker/ICD Insertion  COMPARISON:  Prior chest x-ray performed earlier the same day  EXAM PERFORMED/VIEWS:  XR CHEST PORTABLE FINDINGS:  Left-sided chest wall pacemaker is identified  Pacemaker leads are intact  Heart shadow is enlarged but unchanged from prior exam  The lungs are clear  No pneumothorax or pleural effusion  Osseous structures appear within normal limits for patient age       Impression: Stable cardiomegaly  No acute cardiopulmonary disease  Workstation performed: [id number]     Xr Chest 1 View Portable    Result Date: 8/3/2018  Narrative: CHEST INDICATION:   cp  COMPARISON:  04/10/2017 EXAM PERFORMED/VIEWS:  XR CHEST PORTABLE 1 image FINDINGS: Cardiomediastinal silhouette appears enlarged  The pulmonary vessels are normal  The lungs are clear  No pneumothorax or pleural effusion  Osseous structures appear within normal limits for patient age  Impression: No acute cardiopulmonary disease  Workstation performed: [id number]        EKG / Monitor: Personally reviewed  Monitor shows normal sinus rhythm  Periodic ventricular pacing noted  Pacemaker interrogation shows pacemaker is functioning adequately  With excellent threshold  This is a Medtronic pacemaker  Cardiac testing:   Results for orders placed during the hospital encounter of 18   Echo complete with contrast if indicated    N[address]  [address] [facility]  [phone]    Transthoracic Echocardiogram  2D, M-mode, Doppler, and Color Doppler    Study date:  03-Aug-2018    Patient: Kristina Mccord  MR number: [id number]  Account number: [de-identified]  : 08-May-1926  Age: 80 years  Gender: Male  Status: Stat  Location: Bedside  Height: 59 in  Weight: 124 7 lb  BP: 170/ 74 mmHg    Indications: Pacemaker    Diagnoses: 401 9 - HYPERTENSION NOS    Sonographer:  JACQUE Hawley  Primary Physician:  Petty Prieto DO  Referring Physician:  Faiza Wylie DO  Group:  [facility]  Interpreting Physician:  Faiza Wylie DO    SUMMARY    LEFT VENTRICLE:  Systolic function was normal by visual assessment  Ejection fraction was estimated to be 55 %  There were no regional wall motion abnormalities  There was mild concentric hypertrophy  Doppler parameters were consistent with abnormal left ventricular relaxation (grade 1 diastolic dysfunction)      LEFT ATRIUM:  The atrium was moderately dilated  RIGHT ATRIUM:  The atrium was mildly to moderately dilated  MITRAL VALVE:  There was trace regurgitation  AORTIC VALVE:  The valve was trileaflet  Leaflets exhibited normal thickness, moderate calcification, and normal cuspal separation  There was mild stenosis  There was mild to moderate regurgitation  TRICUSPID VALVE:  There was mild to moderate regurgitation  Pulmonary artery systolic pressure was within the normal range  HISTORY: PRIOR HISTORY: HTN, BPH, Pacemaker, Stroke, GERD    PROCEDURE: The procedure was performed at the bedside  This was a stat study  The transthoracic approach was used  The study included complete 2D imaging, M-mode, complete spectral Doppler, and color Doppler  The heart rate was 50 bpm, at  the start of the study  Echocardiographic views were limited due to restricted patient mobility, poor acoustic window availability, and lung interference  This was a technically difficult study  LEFT VENTRICLE: Size was normal  Systolic function was normal by visual assessment  Ejection fraction was estimated to be 55 %  There were no regional wall motion abnormalities  There was mild concentric hypertrophy  DOPPLER: Doppler  parameters were consistent with abnormal left ventricular relaxation (grade 1 diastolic dysfunction)  RIGHT VENTRICLE: The size was normal  Systolic function was normal  DOPPLER: Systolic pressure was within the normal range  LEFT ATRIUM: The atrium was moderately dilated  RIGHT ATRIUM: The atrium was mildly to moderately dilated  MITRAL VALVE: There was annular calcification  Valve structure was normal  There was normal leaflet separation  No echocardiographic evidence for prolapse  DOPPLER: The transmitral velocity was within the normal range  There was no  evidence for stenosis  There was trace regurgitation  AORTIC VALVE: The valve was trileaflet   Leaflets exhibited normal thickness, moderate calcification, and normal cuspal separation  DOPPLER: Transaortic velocity was minimally increased  There was mild stenosis  There was mild to moderate  regurgitation  TRICUSPID VALVE: The valve structure was normal  There was normal leaflet separation  DOPPLER: The transtricuspid velocity was within the normal range  There was mild to moderate regurgitation  Pulmonary artery systolic pressure was within  the normal range  Estimated peak PA pressure was 33 mmHg  PULMONIC VALVE: Leaflets exhibited normal thickness, no calcification, and normal cuspal separation  DOPPLER: The transpulmonic velocity was within the normal range  There was no regurgitation  PERICARDIUM: There was no thickening  There was no pericardial effusion  AORTA: The root exhibited normal size  PULMONARY ARTERY: The size was normal  The morphology appeared normal     SYSTEM MEASUREMENT TABLES    2D mode  AoR Diam 2D: 2 9 cm  LA Diam (2D): 3 1 cm  LA/Ao (2D): 1 07  FS (2D Teich): 28 5 %  IVSd (2D): 1 21 cm  LVDEV: 63 1 cm³  LVESV: 28 cm³  LVIDd(2D): 3 83 cm  LVISd (2D): 2 74 cm  LVOT Area 2D: 2 84 cm squared  LVPWd (2D): 1 11 cm  SV (Teich): 35 1 cm³    Apical four chamber  LVEF A4C: 60 %    Unspecified Scan Mode  NERIS Cont Eq (Peak Demian): 1 75 cm squared  NERIS Cont Eq (VTI): 1 51 cm squared  LVOT (VTI): 18 5 cm  LVOT Diam : 1 9 cm  LVOT Vmax: 1040 mm/s  LVOT Vmax; Mean: 1040 mm/s  Peak Grad ; Mean: 4 mm[Hg]  SV (LVOT): 53 cm³  VTI;Mean: 2 mm[Hg]  MV Peak A Demian: 1260 mm/s  MV Peak E Demian  Mean: 836 mm/s  MVA (PHT): 3 44 cm squared  PHT: 64 ms  Max P mm[Hg]  V Max: 2580 mm/s  Vmax: 2510 mm/s  RA Area: 23 5 cm squared  RA Volume: 67 2 cm³  TAPSE: 2 3 cm    [employer] Accredited Echocardiography Laboratory    Prepared and electronically signed by    Chucky Han DO  Signed 03-Aug-2018 15:22:06           Dr Vimal Whitlock MD [facility]      "This note has been constructed using a voice recognition system  Therefore there may be syntax, spelling, and/or grammatical errors   Please call if you have any questions  "

## 2018-08-04 NOTE — PROGRESS NOTES
[address] Internal Medicine Progress Note  Patient: Cristina Guajardo 80 y o  male   MRN: [id number]  PCP: Bettejane Ormond, DO  Unit/Bed#: [address] [place] [address] Encounter: [id number]  Date Of Visit: 08/04/18    Problem List:    Principal Problem:    Complete heart block ([address] )  Active Problems:    Essential hypertension    History of CVA (cerebrovascular accident)    Chronic kidney disease, stage III (moderate)    Hyperlipidemia    Chronic indwelling Harper catheter    GERRI (iron deficiency anemia)      Assessment & Plan:    * Complete heart block [facility])   Assessment & Plan    Patient seen by cardiology and is s/p Medtronic VVI pacemaker yesterday  As per Cardiology, pacemaker is functioning adequately  Monitor overnight on telemetry  Echo showed ejection fraction of 55 % with grade 1 diastolic dysfunction           History of CVA (cerebrovascular accident)   Assessment & Plan    History of prior right MCA CVA  Continue aspirin, statin        Essential hypertension   Assessment & Plan    Continue lisinopril  GERRI (iron deficiency anemia)   Assessment & Plan    Continue ferrous sulfate  Chronic indwelling Harper catheter   Assessment & Plan    Due to neurogenic bladder  Continue Harper catheter        Hyperlipidemia   Assessment & Plan    Continue statin        Chronic kidney disease, stage III (moderate)   Assessment & Plan    Creatinine appears at baseline              VTE Pharmacologic Prophylaxis:   Pharmacologic: Pharmacologic VTE Prophylaxis contraindicated due to PPM placement  Mechanical VTE Prophylaxis in Place: Yes    Patient Centered Rounds: I have performed bedside rounds with nursing staff today  Discussions with Specialists or Other Care Team Provider: Yes    Education and Discussions with Family / Patient:Yes    Time Spent for Care: 30 minutes  More than 50% of total time spent on counseling and coordination of care as described above      Current Length of Stay: 1 day(s)    Current Patient Status: Inpatient     Discharge Plan: home    Code Status: Level 3 - DNAR and DNI    Certification Statement: The patient will continue to require additional inpatient hospital stay due to PPM placement, complete heart block      Subjective:   Reports some soreness over the pacemaker site  Denies any shortness of breath    Objective:     Vitals:   Temp (24hrs), Av 6 °F (37 °C), Min:97 7 °F (36 5 °C), Max:99 °F (37 2 °C)    HR:  [48-64] 59  Resp:  [17-48] 20  BP: (120-193)/(65-91) 151/66  SpO2:  [96 %-100 %] 97 %  Body mass index is 24 24 kg/m²  Input and Output Summary (last 24 hours): Intake/Output Summary (Last 24 hours) at 18 1243  Last data filed at 18 0532   Gross per 24 hour   Intake                0 ml   Output              340 ml   Net             -340 ml       Physical Exam:     Physical Exam   Constitutional: He appears well-developed and well-nourished  No distress  HENT:   Head: Normocephalic and atraumatic  Extremely hard of hearing   Eyes: EOM are normal  Right eye exhibits no discharge  Left eye exhibits no discharge  No scleral icterus  Neck: Neck supple  Cardiovascular: Normal rate and regular rhythm  Murmur heard  Pulmonary/Chest: Effort normal and breath sounds normal  No respiratory distress  He has no wheezes  He has no rales  Abdominal: Soft  Bowel sounds are normal  He exhibits no distension  There is no tenderness  Musculoskeletal: He exhibits no edema  Neurological: He is alert  No cranial nerve deficit  Skin: He is not diaphoretic  Psychiatric: Cognition and memory are impaired         Additional Data:     Labs:      Results from last 7 days  Lab Units 18  0540 18  1117   WBC Thousand/uL 6 20 5 71   HEMOGLOBIN g/dL 11 7* 12 6   HEMATOCRIT % 36 8 39 3   PLATELETS Thousands/uL 122* 158   NEUTROS PCT %  --  75   LYMPHS PCT %  --  15   MONOS PCT %  --  7   EOS PCT %  --  1       Results from last 7 days  Lab Units 18  0540 18  1117 SODIUM mmol/L 137 133*   POTASSIUM mmol/L 4 4 4 2   CHLORIDE mmol/L 103 101   CO2 mmol/L 26 22   BUN mg/dL 22 23   CREATININE mg/dL 1 33* 1 46*   CALCIUM mg/dL 8 2* 8 9   TOTAL PROTEIN g/dL  --  7 5   BILIRUBIN TOTAL mg/dL  --  0 40   ALK PHOS U/L  --  65   ALT U/L  --  11*   AST U/L  --  25   GLUCOSE RANDOM mg/dL 96 163*       Results from last 7 days  Lab Units 08/03/18  1117   INR  0 95       * I Have Reviewed All Lab Data Listed Above  * Additional Pertinent Lab Tests Reviewed: All Labs For Current Hospital Admission Reviewed    Imaging:  Xr Chest Portable    Result Date: 8/3/2018  Narrative: CHEST INDICATION:   Patient s/p Pacemaker/ICD Insertion  COMPARISON:  Prior chest x-ray performed earlier the same day  EXAM PERFORMED/VIEWS:  XR CHEST PORTABLE FINDINGS:  Left-sided chest wall pacemaker is identified  Pacemaker leads are intact  Heart shadow is enlarged but unchanged from prior exam  The lungs are clear  No pneumothorax or pleural effusion  Osseous structures appear within normal limits for patient age  Impression: Stable cardiomegaly  No acute cardiopulmonary disease  Workstation performed: [id number]     Xr Chest 1 View Portable    Result Date: 8/3/2018  Narrative: CHEST INDICATION:   cp  COMPARISON:  04/10/2017 EXAM PERFORMED/VIEWS:  XR CHEST PORTABLE 1 image FINDINGS: Cardiomediastinal silhouette appears enlarged  The pulmonary vessels are normal  The lungs are clear  No pneumothorax or pleural effusion  Osseous structures appear within normal limits for patient age  Impression: No acute cardiopulmonary disease  Workstation performed: [id number]     Imaging Reports Reviewed by myself    Cultures:   Blood Culture:   Lab Results   Component Value Date    BLOODCX No Growth After 5 Days  04/10/2017    BLOODCX No Growth After 5 Days   04/10/2017     Urine Culture:   Lab Results   Component Value Date    URINECX 70,000-79,000 cfu/ml Enterococcus faecalis 04/11/2017    URINECX 80,000-89,000 cfu/ml Aerococcus urinae 04/11/2017    URINECX >100,000 cfu/ml Escherichia coli 04/10/2017    URINECX 10,000-19,000 cfu/ml Pseudomonas aeruginosa 04/10/2017     Sputum Culture: No components found for: SPUTUMCX  Wound Culture: No results found for: WOUNDCULT    Last 24 Hours Medication List:     Current Facility-Administered Medications:  acetaminophen 650 mg Oral Q6H PRN Alyssa Revankar, DO    aspirin 81 mg Oral Daily Alyssa Revankar, DO    atorvastatin 40 mg Oral QPM Alyssa Revankar, DO    cefazolin 1,000 mg Intravenous Q12H Samir Elmore MD Last Rate: 1,000 mg (08/04/18 0532)   docusate sodium 200 mg Oral Daily Alyssa Revankar, DO    ferrous sulfate 325 mg Oral Daily With Breakfast Alyssa Revankar, DO    lactulose 10 g Oral Daily Alyssa Revankar, DO    lisinopril 2 5 mg Oral BID Alyssa Revankar, DO    ondansetron 4 mg Intravenous Q6H PRN Alyssa Revankar, DO    pantoprazole 40 mg Oral Daily Alyssa Revankar, DO    polyethylene glycol 17 g Oral Daily Alyssa Revankar, DO         Today, Patient Was Seen By: Mo Goncalves DO    ** Please Note: Dragon 360 Dictation voice to text software may have been used in the creation of this document   **

## 2018-08-04 NOTE — SOCIAL WORK
SW met with pt and spoke with son over phone to assess needs and discuss plans  Discussed goals of making sure pt's needs are met upon discharge, pt's preferences are taken into account, pt understands her health condition, medications and symptoms to watch for after returning home and pt is aware of any follow up appointments recommended by hospital physician  Pt lives with his son and daughter-in-law  Pt has cane and walker at home  States he uses his cane mostly  No HHC services at this time  Pt's PCP is Dr Kate Wright, DO  Preferred pharmacy is [facility]  Per son pt has prescription coverage and no difficulty getting his medication as prescribed  Pt and son's plan is for pt to return home when discharged  Son feels pt's care needs are increasing  Despite that family is committed to caring for pt at home so son is requesting home care services  Son also wants [facility] for pt  SW discussed both home care and county services with son  Recommended son contact [facility] for ongoing homemaker services as well as [facility]  Son familiar with agency and phone number  With regards to home care son had no agency preference so referral will be made to [facility] for services post hospitalization  Offered ongoing assistance to son  Son has CHEL[facility] phone number for difference if needed  SW will continue to follow to assist with planning as needed

## 2018-08-04 NOTE — PLAN OF CARE
Problem: DISCHARGE PLANNING - CARE MANAGEMENT  Goal: Discharge to post-acute care or home with appropriate resources  INTERVENTIONS:  - Conduct assessment to determine patient/family and health care team treatment goals, and need for post-acute services based on payer coverage, community resources, and patient preferences, and barriers to discharge  - Address psychosocial, clinical, and financial barriers to discharge as identified in assessment in conjunction with the patient/family and health care team  - Arrange appropriate level of post-acute services according to patient's   needs and preference and payer coverage in collaboration with the physician and health care team  - Communicate with and update the patient/family, physician, and health care team regarding progress on the discharge plan  - Arrange appropriate transportation to post-acute venues    [address]  Outcome: [address] [facility] referral will be made to [facility]

## 2018-08-05 VITALS
WEIGHT: 120 LBS | TEMPERATURE: 98.3 F | SYSTOLIC BLOOD PRESSURE: 99 MMHG | OXYGEN SATURATION: 97 % | DIASTOLIC BLOOD PRESSURE: 54 MMHG | RESPIRATION RATE: 16 BRPM | BODY MASS INDEX: 24.24 KG/M2 | HEART RATE: 63 BPM

## 2018-08-05 PROBLEM — I44.2 COMPLETE HEART BLOCK (HCC): Status: RESOLVED | Noted: 2018-08-03 | Resolved: 2018-08-05

## 2018-08-05 LAB
ANION GAP SERPL CALCULATED.3IONS-SCNC: 7 MMOL/L (ref 4–13)
BUN SERPL-MCNC: 19 MG/DL (ref 5–25)
CALCIUM SERPL-MCNC: 8.3 MG/DL (ref 8.3–10.1)
CHLORIDE SERPL-SCNC: 102 MMOL/L (ref 100–108)
CO2 SERPL-SCNC: 25 MMOL/L (ref 21–32)
CREAT SERPL-MCNC: 1.27 MG/DL (ref 0.6–1.3)
GFR SERPL CREATININE-BSD FRML MDRD: 49 ML/MIN/1.73SQ M
GLUCOSE SERPL-MCNC: 95 MG/DL (ref 65–140)
MRSA NOSE QL CULT: NORMAL
POTASSIUM SERPL-SCNC: 4.4 MMOL/L (ref 3.5–5.3)
SODIUM SERPL-SCNC: 134 MMOL/L (ref 136–145)

## 2018-08-05 PROCEDURE — G8979 MOBILITY GOAL STATUS: HCPCS

## 2018-08-05 PROCEDURE — 80048 BASIC METABOLIC PNL TOTAL CA: CPT | Performed by: FAMILY MEDICINE

## 2018-08-05 PROCEDURE — 97163 PT EVAL HIGH COMPLEX 45 MIN: CPT

## 2018-08-05 PROCEDURE — G8978 MOBILITY CURRENT STATUS: HCPCS

## 2018-08-05 PROCEDURE — 99239 HOSP IP/OBS DSCHRG MGMT >30: CPT | Performed by: FAMILY MEDICINE

## 2018-08-05 PROCEDURE — 99024 POSTOP FOLLOW-UP VISIT: CPT | Performed by: INTERNAL MEDICINE

## 2018-08-05 RX ORDER — FERROUS SULFATE 325(65) MG
325 TABLET ORAL
Start: 2018-08-05 | End: 2018-09-04

## 2018-08-05 RX ADMIN — PANTOPRAZOLE SODIUM 40 MG: 40 TABLET, DELAYED RELEASE ORAL at 08:18

## 2018-08-05 RX ADMIN — LACTULOSE 10 G: 20 SOLUTION ORAL at 08:18

## 2018-08-05 RX ADMIN — DOCUSATE SODIUM 200 MG: 100 CAPSULE, LIQUID FILLED ORAL at 08:18

## 2018-08-05 RX ADMIN — Medication 325 MG: at 08:18

## 2018-08-05 RX ADMIN — LISINOPRIL 2.5 MG: 2.5 TABLET ORAL at 08:18

## 2018-08-05 RX ADMIN — ASPIRIN 81 MG 81 MG: 81 TABLET ORAL at 08:18

## 2018-08-05 RX ADMIN — POLYETHYLENE GLYCOL 3350 17 G: 17 POWDER, FOR SOLUTION ORAL at 08:18

## 2018-08-05 NOTE — NURSING NOTE
AVS reviewed with son (primary caregiver)  IV removed from patient tele removed   Pt left via wheelchair with son

## 2018-08-05 NOTE — PHYSICAL THERAPY NOTE
PT EVALUATION  NOTE: Patient should wear shoes at all times for transfers and gait activity   08/05/18 1150   Pain Assessment   Pain Assessment Cai-Baker FACES   Cai-Baker FACES Pain Rating 4  (chest pain/"burning" and nurse aware)   Home Living   Type of [address] [place] [address] Two level;Stairs to enter with rails  (5 stairs to enter)   [address] [place] chair   Home Equipment Walker;Quad cane   Additional Comments patient using roller walker most recently all the time except on second floor of home  where [address] is narrow and quad cane utilized   Prior Function   Level of [place] Needs assistance with ADLs and functional mobility   Lives With Son   Receives Help From Family   ADL Assistance Needs assistance   IADLs Needs assistance   Falls in the last 6 months 1 to 4   Comments patient and son report patient basically independent with ADLs prior to 3 weeks ago but with declining function and now requiring use of roller walker at all times versus quad cane  patient unsafe to get into shower recently due to weakness as per son therefore sponge bathing with assist of family   Restrictions/Precautions   Braces or Orthoses (lift shoe on L 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)   Other Precautions Chair Alarm; Bed Alarm; Fall Risk;Pain  (must wear shoes at all times for transfers and gait)   General   Additional Pertinent History chart reviewed, patient admitted with chest pain/complete heart block  now s/p pacemaker placement on 8-3-18   Patient living with son prior to admission and has had recent decline in function due to weakness and decreased balance   Family/Caregiver Present Yes   Cognition   Overall Cognitive Status WFL   Arousal/Participation Cooperative   Attention Attends with cues to redirect   Orientation Level Oriented X4   Following Commands Follows multistep commands with increased time or repetition   Comments patient [place]   RLE Assessment   RLE Assessment (ROM WFL, strength 3+/5)   LLE Assessment   LLE Assessment (ROM WFL except hip fused/chronic, strength 3+/5)   Coordination   Movements are Fluid and Coordinated 0   Transfers   Sit to Stand 4  Minimal assistance   Additional items Assist x 1   Stand to Sit 4  Minimal assistance   Additional items Assist x 1   Ambulation/Elevation   Gait Assistance 4  Minimal assist   Additional items Assist x 1   Assistive Device Rolling walker   Distance 60 feet x 2 with sitting rest period between, gait completed with shoes donned   Stair Management Assistance 4  Minimal assist   Additional items Assist x 1;Verbal cues; Tactile cues   Stair Management Technique Step to pattern; Two rails  (also one rail and assist of 1)   Number of Stairs 8   Balance   Static Sitting Fair   Dynamic Sitting Fair -   Static Standing Fair -   Dynamic Standing Poor   Ambulatory Poor   Activity Tolerance   Activity Tolerance Patient limited by fatigue;Patient limited by pain   Nurse Made Aware yes   Assessment   Prognosis Good   Problem List Decreased strength;Decreased range of motion;Decreased endurance; Impaired balance;Decreased mobility; Decreased coordination;Pain  ([place])   Assessment Patient seen for Physical Therapy evaluation  Patient admitted with Complete heart block ([facility] [address] )  Comorbidities affecting patient's physical performance include:CVA, gait dysfunction, CKD, hyperlipidemia, chronic horn catheter, GERRI, HTN  Personal factors affecting patient at time of initial evaluation include: ambulating with assistive device, stairs to enter home, inability to ambulate household distances, inability to navigate community distances, inability to navigate level surfaces without external assistance, inability to perform dynamic tasks in community, limited home support, positive fall history, inability to perform physical activity, inability to perform ADLS and inability to perform IADLS    Prior to admission, patient was independent with functional mobility with walker, independent with ADLS, requiring assist for IADLS, living in a multi-level home, ambulating household distance, ambulating community distances and home with family assist   Please find objective findings from Physical Therapy assessment regarding body systems outlined above with impairments and limitations including weakness, decreased ROM, impaired balance, decreased endurance, impaired coordination, gait deviations, pain, decreased activity tolerance, decreased functional mobility tolerance and fall risk  The Barthel Index was used as a functional outcome tool presenting with a score of 40 today indicating marked limitations of functional mobility and ADLS  Patient's clinical presentation is currently unstable/unpredictable as seen in patient's presentation of vital sign response, changing level of pain, increased fall risk, new onset of impairment of functional mobility, decreased endurance and new onset of weakness  Pt would benefit from continued Physical Therapy treatment to address deficits as defined above and maximize level of functional mobility  As demonstrated by objective findings, the assigned level of complexity for this evaluation is high  Goals   Patient Goals "to go home"   [facility] Expiration Date 08/12/18   Short Term Goal #1 transfers and gait with roller walker with supervision   Short Term Goal #2 gait endurance to 100 feet, strength BLEs 3+/4- all in preparation for patient to return home as per patient goal    Plan   Treatment/Interventions ADL retraining;Functional transfer training;LE strengthening/ROM; Therapeutic exercise; Endurance training;Patient/family training;Equipment eval/education; Bed mobility;Gait training;Elevations   PT Frequency 5x/wk   Recommendation   Recommendation (home with PT services and family)   Barthel Index   Feeding 5   Bathing 0   Grooming Score 0   Dressing Score 5   Bladder Score 0   Bowels Score 10   Toilet Use Score 5   Transfers (Bed/Chair) Score 10   Mobility (Level Surface) Score 0   Stairs Score 5   Barthel Index Score 36   Licensure   [place] License Number  Annamaria Keller PT [id number]

## 2018-08-05 NOTE — PROGRESS NOTES
Progress Note - Cardiology   [facility]     Christophe Bhardwaj 80 y o  male MRN: [id number]  : 1926  Unit/Bed#: [address] [address] Encounter: [id number]    Assessment and Plan:   1  Syncope with intermittent complete heart block status post Medtronic single-chamber pacemaker  Which is functioning adequately  2   History of previous CVA  On aspirin and atorvastatin  Can resume aspirin  3  Dyslipidemia on statin  4  Essential hypertension  On lisinopril acceptable blood pressure  5  History of urinary tract obstruction on chronic Harper catheter and recurrent UTI  6  Chronic abdominal pain  7  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 with patient using walker  Need PT OT  Plan  Continue current Rx  Patient had Medtronic VVI pacemaker placed as family want to do the minimum necessary procedure  So far ITs rate is set as 55 beats per minute to avoid competitive pacing  Pacemaker functioning adequate  Continue monitoring overnight   Follow up with Cardiology as an outpatient in few days for wound check  Subjective / Objective:   Patient seen and evaluated  Son was present bedside  No new complaints  Patient's heart rate remained stable  Chronic episodes of ventricular pacing noted  Pacemaker site is clean  No other significant complaint today  Vitals: Blood pressure 99/54, pulse 63, temperature 98 3 °F (36 8 °C), temperature source Temporal, resp  rate 16, weight 54 4 kg (120 lb), SpO2 97 %  Vitals:    18 1659   Weight: 54 4 kg (120 lb)     Body mass index is 24 24 kg/m²    BP Readings from Last 3 Encounters:   18 99/54   18 121/57   17 116/82     Orthostatic Blood Pressures      Most Recent Value   Blood Pressure  99/54 filed at 2018 1058   Patient Position - Orthostatic VS  Sitting filed at 2018 1058        I/O       / 07 - 08/ 0700 08/ 07 -  0700 / 07 -  0700    Urine (mL/kg/hr)  340     Total Output   340      Net   -340                 Invasive Devices     Peripheral Intravenous Line            Peripheral IV 08/03/18 Right Antecubital 2 days          Drain            Urethral Catheter 16 Fr  865 days    Urethral Catheter 2 days                  Intake/Output Summary (Last 24 hours) at 08/05/18 1244  Last data filed at 08/05/18 0709   Gross per 24 hour   Intake                0 ml   Output              450 ml   Net             -450 ml         Physical Exam:   Physical Exam   Constitutional: He is oriented to person, place, and time  He appears well-developed  No distress  HENT:   Head: Normocephalic and atraumatic  Eyes: Pupils are equal, round, and reactive to light  Neck: Normal range of motion  Neck supple  No JVD present  No thyromegaly present  Cardiovascular:   S1-S2 regular with a 2/6 ejection systolic murmur  Pulmonary/Chest: Effort normal and breath sounds normal  No respiratory distress  He has no wheezes  He has no rales  Abdominal: Soft  Bowel sounds are normal  He exhibits no distension  There is no tenderness  There is no rebound  Musculoskeletal: Normal range of motion  He exhibits no edema or tenderness  Neurological: He is alert and oriented to person, place, and time  Skin: Skin is warm and dry  He is not diaphoretic  Psychiatric: He has a normal mood and affect   His behavior is normal  Judgment normal            Medications/ Allergies:       Current Facility-Administered Medications:  acetaminophen 650 mg Oral Q6H PRN Alyssa Revankar, DO   aspirin 81 mg Oral Daily Alyssa Revankar, DO   atorvastatin 40 mg Oral QPM Alyssa Revankar, DO   docusate sodium 200 mg Oral Daily Alyssa Revankar, DO   ferrous sulfate 325 mg Oral Daily With Breakfast Alyssa Revankar, DO   lactulose 10 g Oral Daily Alyssa Revankar, DO   lisinopril 2 5 mg Oral BID Alyssa Revankar, DO   ondansetron 4 mg Intravenous Q6H PRN Alyssa Revankar, DO   pantoprazole 40 mg Oral Daily Alyssa Revankar, DO polyethylene glycol 17 g Oral Daily Alyssa Revaustinar, DO       acetaminophen 650 mg Q6H PRN   ondansetron 4 mg Q6H PRN     No Known Allergies        Labs:   Troponins:    Results from last 7 days  Lab Units 08/03/18  1117   TROPONIN I ng/mL <0 02       CBC with diff:    Results from last 7 days  Lab Units 08/04/18  0540 08/03/18  1117   WBC Thousand/uL 6 20 5 71   HEMOGLOBIN g/dL 11 7* 12 6   HEMATOCRIT % 36 8 39 3   MCV fL 93 94   PLATELETS Thousands/uL 122* 158   MCH pg 29 7 30 1   MCHC g/dL 31 8 32 1   RDW % 14 1 14 3   MPV fL 13 6* 13 9*   NRBC AUTO /100 WBCs  --  0       CMP:    Results from last 7 days  Lab Units 08/05/18  0557 08/04/18  0540 08/03/18  1117   SODIUM mmol/L 134* 137 133*   POTASSIUM mmol/L 4 4 4 4 4 2   CHLORIDE mmol/L 102 103 101   CO2 mmol/L 25 26 22   ANION GAP mmol/L 7 8 10   BUN mg/dL 19 22 23   CREATININE mg/dL 1 27 1 33* 1 46*   GLUCOSE RANDOM mg/dL 95 96 163*   CALCIUM mg/dL 8 3 8 2* 8 9   AST U/L  --   --  25   ALT U/L  --   --  11*   ALK PHOS U/L  --   --  65   TOTAL PROTEIN g/dL  --   --  7 5   BILIRUBIN TOTAL mg/dL  --   --  0 40   EGFR ml/min/1 73sq m 49 46 41       Magnesium:    Results from last 7 days  Lab Units 08/04/18  0540 08/03/18  1117   MAGNESIUM mg/dL 2 0 2 1     Coags:    Results from last 7 days  Lab Units 08/03/18  1117   PTT seconds 22*   INR  0 95     TSH:    Results from last 7 days  Lab Units 08/04/18  0540 08/03/18  1117   TSH 3RD GENERATON uIU/mL 1 581 3 267     NT-proBNP:   Recent Labs      08/03/18   1117   NTBNP  595*        Imaging & Testing   I have personally reviewed pertinent reports  Xr Chest Portable    Result Date: 8/3/2018  Narrative: CHEST INDICATION:   Patient s/p Pacemaker/ICD Insertion  COMPARISON:  Prior chest x-ray performed earlier the same day  EXAM PERFORMED/VIEWS:  XR CHEST PORTABLE FINDINGS:  Left-sided chest wall pacemaker is identified  Pacemaker leads are intact   Heart shadow is enlarged but unchanged from prior exam  The lungs are clear   No pneumothorax or pleural effusion  Osseous structures appear within normal limits for patient age  Impression: Stable cardiomegaly  No acute cardiopulmonary disease  Workstation performed: [id number]     Xr Chest 1 View Portable    Result Date: 8/3/2018  Narrative: CHEST INDICATION:   cp  COMPARISON:  04/10/2017 EXAM PERFORMED/VIEWS:  XR CHEST PORTABLE 1 image FINDINGS: Cardiomediastinal silhouette appears enlarged  The pulmonary vessels are normal  The lungs are clear  No pneumothorax or pleural effusion  Osseous structures appear within normal limits for patient age  Impression: No acute cardiopulmonary disease  Workstation performed: [id number]        EKG / Monitor: Personally reviewed  Monitor shows normal sinus rhythm  Periodic ventricular pacing noted  Pacemaker interrogation shows pacemaker is functioning adequately  With excellent threshold  This is a Medtronic pacemaker  Cardiac testing:   Results for orders placed during the hospital encounter of 18   Echo complete with contrast if indicated    Narrative Pro 39  [address] [facility]  [phone]    Transthoracic Echocardiogram  2D, M-mode, Doppler, and Color Doppler    Study date:  03-Aug-2018    Patient: Carlitos Jewell  MR number: [id number]  Account number: [de-identified]  : 08-May-1926  Age: 80 years  Gender: Male  Status: Stat  Location: Bedside  Height: 59 in  Weight: 124 7 lb  BP: 170/ 74 mmHg    Indications: Pacemaker    Diagnoses: 401 9 - HYPERTENSION NOS    Sonographer:  JACQUE Washington  Primary Physician:  Delfina Woodard DO  Referring Physician:  Harshil Butler DO  Group:  [facility]  Interpreting Physician:  Harshil Butler DO    SUMMARY    LEFT VENTRICLE:  Systolic function was normal by visual assessment  Ejection fraction was estimated to be 55 %  There were no regional wall motion abnormalities    There was mild concentric hypertrophy  Doppler parameters were consistent with abnormal left ventricular relaxation (grade 1 diastolic dysfunction)  LEFT ATRIUM:  The atrium was moderately dilated  RIGHT ATRIUM:  The atrium was mildly to moderately dilated  MITRAL VALVE:  There was trace regurgitation  AORTIC VALVE:  The valve was trileaflet  Leaflets exhibited normal thickness, moderate calcification, and normal cuspal separation  There was mild stenosis  There was mild to moderate regurgitation  TRICUSPID VALVE:  There was mild to moderate regurgitation  Pulmonary artery systolic pressure was within the normal range  HISTORY: PRIOR HISTORY: HTN, BPH, Pacemaker, Stroke, GERD    PROCEDURE: The procedure was performed at the bedside  This was a stat study  The transthoracic approach was used  The study included complete 2D imaging, M-mode, complete spectral Doppler, and color Doppler  The heart rate was 50 bpm, at  the start of the study  Echocardiographic views were limited due to restricted patient mobility, poor acoustic window availability, and lung interference  This was a technically difficult study  LEFT VENTRICLE: Size was normal  Systolic function was normal by visual assessment  Ejection fraction was estimated to be 55 %  There were no regional wall motion abnormalities  There was mild concentric hypertrophy  DOPPLER: Doppler  parameters were consistent with abnormal left ventricular relaxation (grade 1 diastolic dysfunction)  RIGHT VENTRICLE: The size was normal  Systolic function was normal  DOPPLER: Systolic pressure was within the normal range  LEFT ATRIUM: The atrium was moderately dilated  RIGHT ATRIUM: The atrium was mildly to moderately dilated  MITRAL VALVE: There was annular calcification  Valve structure was normal  There was normal leaflet separation  No echocardiographic evidence for prolapse  DOPPLER: The transmitral velocity was within the normal range   There was no  evidence for stenosis  There was trace regurgitation  AORTIC VALVE: The valve was trileaflet  Leaflets exhibited normal thickness, moderate calcification, and normal cuspal separation  DOPPLER: Transaortic velocity was minimally increased  There was mild stenosis  There was mild to moderate  regurgitation  TRICUSPID VALVE: The valve structure was normal  There was normal leaflet separation  DOPPLER: The transtricuspid velocity was within the normal range  There was mild to moderate regurgitation  Pulmonary artery systolic pressure was within  the normal range  Estimated peak PA pressure was 33 mmHg  PULMONIC VALVE: Leaflets exhibited normal thickness, no calcification, and normal cuspal separation  DOPPLER: The transpulmonic velocity was within the normal range  There was no regurgitation  PERICARDIUM: There was no thickening  There was no pericardial effusion  AORTA: The root exhibited normal size  PULMONARY ARTERY: The size was normal  The morphology appeared normal     SYSTEM MEASUREMENT TABLES    2D mode  AoR Diam 2D: 2 9 cm  LA Diam (2D): 3 1 cm  LA/Ao (2D): 1 07  FS (2D Teich): 28 5 %  IVSd (2D): 1 21 cm  LVDEV: 63 1 cm³  LVESV: 28 cm³  LVIDd(2D): 3 83 cm  LVISd (2D): 2 74 cm  LVOT Area 2D: 2 84 cm squared  LVPWd (2D): 1 11 cm  SV (Teich): 35 1 cm³    Apical four chamber  LVEF A4C: 60 %    Unspecified Scan Mode  NERIS Cont Eq (Peak Demian): 1 75 cm squared  NERIS Cont Eq (VTI): 1 51 cm squared  LVOT (VTI): 18 5 cm  LVOT Diam : 1 9 cm  LVOT Vmax: 1040 mm/s  LVOT Vmax; Mean: 1040 mm/s  Peak Grad ; Mean: 4 mm[Hg]  SV (LVOT): 53 cm³  VTI;Mean: 2 mm[Hg]  MV Peak A Demian: 1260 mm/s  MV Peak E Demian   Mean: 836 mm/s  MVA (PHT): 3 44 cm squared  PHT: 64 ms  Max P mm[Hg]  V Max: 2580 mm/s  Vmax: 2510 mm/s  RA Area: 23 5 cm squared  RA Volume: 67 2 cm³  TAPSE: 2 3 cm    [facility] Accredited Echocardiography [facility]    Prepared and electronically signed by    Mildred Gamboa DO  Signed 03-Aug-2018 15:22:06 Dr Samir Elmore MD [facility]      "This note has been constructed using a voice recognition system  Therefore there may be syntax, spelling, and/or grammatical errors   Please call if you have any questions  "

## 2018-08-05 NOTE — DISCHARGE SUMMARY
Discharge Summary - [facility]    Patient Information: Tina Herbert 80 y o  male MRN: [id number]  Unit/Bed#: [address] [address] Encounter: [id number]    Discharging Physician / Practitioner: Lotus Fitzgerald DO  PCP: Ariella Couch DO  Admission Date: 8/3/2018  Discharge Date: 08/05/18    Reason for Admission: Chest Pain (started about an hour ago  hx 3 strokes   )      Discharge Diagnoses:     Principal Problem (Resolved): Complete heart block (HCC)  Active Problems:    Essential hypertension    History of CVA (cerebrovascular accident)    Chronic kidney disease, stage III (moderate)    Hyperlipidemia    Chronic indwelling Harper catheter    GERRI (iron deficiency anemia)        * Complete heart block (HCC)-resolved as of 8/5/2018   Assessment & Plan    Patient seen by cardiology and is s/p Medtronic VVI pacemaker placement  As per Cardiology, pacemaker is functioning adequately  Patient to follow up Cardiology in 1 week  Pacemaker dressing to stay in place and patient's son advised to not get the dressing wet  Echo showed ejection fraction of 55 % with grade 1 diastolic dysfunction           History of CVA (cerebrovascular accident)   Assessment & Plan    History of prior right MCA CVA  Continue aspirin, statin  Essential hypertension   Assessment & Plan    Continue lisinopril        GERRI (iron deficiency anemia)   Assessment & Plan    Continue ferrous sulfate as he uses at home        Chronic indwelling Harper catheter   Assessment & Plan    Due to neurogenic bladder    Continue Harper catheter        Hyperlipidemia   Assessment & Plan    Continue statin        Chronic kidney disease, stage III (moderate)   Assessment & Plan    Creatinine at baseline            Consultations During Hospital Stay:  [address]    Procedures Performed:     · Echo  · PPM placement    Significant Findings:     See hospital course and above    Imaging while in hospital:    Xr Chest Portable    Result Date: 8/3/2018  Narrative: CHEST INDICATION:   Patient s/p Pacemaker/ICD Insertion  COMPARISON:  Prior chest x-ray performed earlier the same day  EXAM PERFORMED/VIEWS:  XR CHEST PORTABLE FINDINGS:  Left-sided chest wall pacemaker is identified  Pacemaker leads are intact  Heart shadow is enlarged but unchanged from prior exam  The lungs are clear  No pneumothorax or pleural effusion  Osseous structures appear within normal limits for patient age  Impression: Stable cardiomegaly  No acute cardiopulmonary disease  Workstation performed: [id number]     Xr Chest 1 View Portable    Result Date: 8/3/2018  Narrative: CHEST INDICATION:   cp  COMPARISON:  04/10/2017 EXAM PERFORMED/VIEWS:  XR CHEST PORTABLE 1 image FINDINGS: Cardiomediastinal silhouette appears enlarged  The pulmonary vessels are normal  The lungs are clear  No pneumothorax or pleural effusion  Osseous structures appear within normal limits for patient age  Impression: No acute cardiopulmonary disease  Workstation performed: [id number]     Xr Chest 2 Views    Result Date: 8/4/2018  Narrative: CHEST INDICATION:   Patient s/p Pacemaker/ICD Insertion  COMPARISON:  8/3/2018 EXAM PERFORMED/VIEWS:  XR CHEST PA & LATERAL Images: 2 FINDINGS:  Left-sided chest wall pacemaker is identified  Pacemaker leads are intact  Heart shadow is enlarged but unchanged from prior exam  The lungs are clear  No pneumothorax or pleural effusion  Osseous structures appear within normal limits for patient age  Impression: No acute cardiopulmonary disease  Left apical pacemaker  No pneumothorax   Workstation performed: [id number]       Incidental Findings:   · none     Test Results Pending at Discharge (will require follow up):   · As per After Visit Summary     Outpatient Tests Requested:  · N/A    Complications:  See hospital course and above    Hospital Course:     Farida Cerda is a 80 y o  male patient who originally presented to the hospital on 8/3/2018 due to chest pain and left arm numbness  In the ER, EKG showed complete heart block and he had pacemaker placed by Cardiology and was admitted  Patient was monitored in the hospital   No signs of bleeding  Pacemaker adequately functioning as per Cardiology  He was cleared by Cardiology prior to discharge home  Discharge plan was discussed with not only the patient but also his son who was present at bedside    Please see above list of diagnoses and related plan for additional information  Condition at Discharge: stable     Discharge Day Visit / Exam:     Subjective:  Denies any chest pain, shortness of breath    Vitals: Blood Pressure: 99/54 (08/05/18 1058)  Pulse: 63 (08/05/18 1058)  Temperature: 98 3 °F (36 8 °C) (08/05/18 1058)  Temp Source: Temporal (08/05/18 1058)  Respirations: 16 (08/05/18 1058)  Weight - Scale: 54 4 kg (120 lb) (08/03/18 1659)  SpO2: 97 % (08/05/18 1058)    Exam:   Physical Exam   Constitutional: He is oriented to person, place, and time  He appears well-developed and well-nourished  No distress  HENT:   Head: Normocephalic and atraumatic  Extremely hard of hearing   Eyes: EOM are normal  Right eye exhibits no discharge  Left eye exhibits no discharge  No scleral icterus  Neck: Neck supple  Cardiovascular: Normal rate and regular rhythm  Murmur heard  Pulmonary/Chest: Effort normal and breath sounds normal  No respiratory distress  He has no wheezes  He has no rales  Abdominal: Soft  Bowel sounds are normal  He exhibits no distension  There is no tenderness  PPM in place with dressing on it   Musculoskeletal: He exhibits no edema  Neurological: He is alert and oriented to person, place, and time  No cranial nerve deficit  Skin: Skin is dry  He is not diaphoretic  Psychiatric: He has a normal mood and affect         Discharge instructions/Information to patient and family:(Discharge Medications and Follow up):   See after visit summary for information provided to patient and family  Provisions for Follow-Up Care:  See after visit summary for information related to follow-up care and any pertinent home health orders  Disposition: Home with [facility]    Planned Readmission:  No     Discharge Statement:  I spent > 30 minutes discharging the patient  This time was spent on the day of discharge  I had direct contact with the patient on the day of discharge  Greater than 50% of the total time was spent examining patient, answering all patient questions, arranging and discussing plan of care with patient as well as directly providing post-discharge instructions  Additional time then spent on discharge activities  Discharge Medications:  See after visit summary for reconciled discharge medications provided to patient and family  ** Please Note:  Dictation voice to text software may have been used in the creation of this document   **

## 2018-08-06 NOTE — SOCIAL WORK
Late note:  Pt was discharged home with family on 8/5/18  [facility] was notified of discharge today and [facility]S/F2F was sent to agency

## 2018-08-07 ENCOUNTER — TELEPHONE (OUTPATIENT)
Dept: CARDIOLOGY CLINIC | Facility: CLINIC | Age: 83
End: 2018-08-07

## 2018-08-07 NOTE — TELEPHONE ENCOUNTER
Eliezer Goodell, TOMÁS - bp today: 86/50, 88/50, HR: 60, no chest pain, shortness of breath, palpitations or dizziness - not eating/drinking much; ate today, sleeping a lot - pacemaker implant done 8/3/18 - advise of any changes

## 2018-08-08 ENCOUNTER — TELEPHONE (OUTPATIENT)
Dept: CARDIOLOGY CLINIC | Facility: CLINIC | Age: 83
End: 2018-08-08

## 2022-09-11 NOTE — OCCUPATIONAL THERAPY NOTE
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,)   Other Precautions Hard of hearing; Chair Alarm; Bed Alarm; Fall Risk  (LUE immobilizer, no ROM above shoulder height)   Pain Assessment   Pain Assessment 0-10   Pain Score 5   Pain Type Acute pain;Chronic pain   Pain Location Abdomen   Pain Orientation Mid;Lower   Pain Descriptors [facility] Pain Intervention(s) MD notified (Comment); Repositioned; Ambulation/increased activity; Distraction   Response to Interventions some relief   Home Living   Type of [facility] Two level;Bed/bath upstairs;1/2 bath on main level  (5 [address]STINI)   Bathroom Shower/Tub Tub/shower unit   Bathroom Equipment Shower chair   Bathroom Accessibility Accessible   Home Equipment Walker;Quad cane   Prior Function   Level of [place] Needs assistance with ADLs and functional mobility; Needs assistance with IADLs   Lives With Son   Receives Help From Family   ADL Assistance Needs assistance  (Isha due to left fused hip)   IADLs Needs assistance   Falls in the last 6 months 1 to 4   Comments Pt ambulated without AD in the home, except uses QC on stairs and RW outside  Pt must wear orthopedic shoes with his white socks when ambulating at all times per son  Psychosocial   Psychosocial (WDL) WDL   Length of Time/Family Visitation 31 min -1hr  (son)   Subjective   Subjective "My stomach hurts a little   It comes and goes "   ADL   Where Assessed Chair   Eating Assistance 4  Minimal Assistance   Grooming Assistance 3  Moderate Assistance   UB Bathing Assistance 3  Moderate Assistance   LB Bathing Assistance 3  Moderate Assistance   UB Dressing Assistance 3  Moderate Assistance   LB Dressing Assistance 3  Moderate [address]  3  Moderate Assistance   Bed Mobility   Supine to Sit 4  Minimal assistance   Additional items Assist x 1;Verbal cues;Increased time required   Transfers   Sit to Stand 4  Minimal assistance   Additional items Assist x 1   Stand to Sit 4  Minimal assistance   Additional items Assist x 1   Stand pivot 4  Minimal assistance   Additional items Assist x 1   Functional Mobility   Functional Mobility 4  Minimal assistance   Additional Comments 20 feet with QC   Balance   Static Sitting Fair   Dynamic Sitting Fair -   Static Standing Fair -   Dynamic Standing Poor   Ambulatory Fair -  (with QC)   Activity Tolerance   Activity Tolerance Patient limited by pain; Patient limited by Saeid Levels Dr Mixon    Nurse Made Aware yes   RUE Assessment   RUE Assessment WFL   LUE Assessment   LUE Assessment X  (not tested due to LUE immobilizer s/p pacer; [facility])   Vision-Basic Assessment   Current Vision Does not wear glasses   Cognition   Arousal/Participation Alert; Cooperative   Attention Attends with cues to redirect   Orientation Level Oriented to person;Oriented to place;Oriented to situation;Disoriented to time   Memory Decreased short term memory   Following Commands Follows multistep commands with increased time or repetition   Assessment   Limitation Decreased ADL status; Decreased UE ROM; Decreased UE strength;Decreased cognition;Decreased endurance;Decreased self-care trans;Decreased high-level ADLs  (decreased balance)   Prognosis Good   Assessment Patient evaluated by Occupational Therapy  Patient admitted with Complete heart block [facility]), s/p pacemaker 8/3/18  The patients occupational profile, medical and therapy history includes a extensive additional review of physical, cognitive, or psychosocial history related to current functional performance    Comorbidities affecting functional mobility and ADLS include: CVA and hypertension,  Dyslipidemia, uriinary tract obstruction on chronic Harper catheter, recurrent UTI, Chronic abdominal pain, DJD, 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  Prior to admission, patient was independent with functional mobility with QC or RW outside the home, requiring assist for ADLS, requiring assist for IADLS, living with son in a 2 level home with 5 steps to enter, ambulating household distance and ambulating community distances  The evaluation identifies the following performance deficits: weakness, decreased ROM, impaired balance, decreased endurance, increased fall risk, decreased ADLS, decreased IADLS, pain, decreased activity tolerance, SOB upon exertion and decreased strength and very hard of hearing, that result in activity limitations and/or participation restrictions  This evaluation requires clinical decision making of high complexity, because the patient presents with comorbidites that affect occupational performance and required significant modification of tasks or assistance with consideration of multiple treatment options  The Barthel Index was used as a functional outcome tool presenting with a score of 35, indicating marked limitations of functional mobility and ADLS  Patient will benefit from skilled Occupational Therapy services to address above deficits and facilitate a safe return to prior level of function  Goals   Patient Goals go home   STG Time Frame (1-7)   Short Term Goal #1 Patient will increase standing tolerance to 3 minutes during functional activity; Patient will increase bed mobility to independent; Patient will increase functional mobility to and from bathroom with rolling walker with supervision to increase performance with ADLS; Patient will tolerate 8 minutes of UE ROM/strengthening to increase general activity tolerance and performance in ADLS/IADLS  LTG Time Frame (8-14)   Long Term Goal #1 Patient will increase standing tolerance to 6 minutes during functional activity;  Patient will increase functional mobility to and from bathroom with rolling walker independently to increase performance with ADLS; Patient will tolerate 15 minutes of UE ROM/strengthening to increase general activity tolerance and performance in ADLS/IADLS  Functional Transfer Goals   Pt Will Perform All Functional Transfers With min assist;With assistive devices; With good judgment/safety  (LTG - Independent with AD and ortho shoes)   ADL Goals   Pt Will Perform Eating In chair; With stand by assist  (LTG- Independent)   Pt Will Perform Grooming In chair; With stand by assist  (LTG- Independent)   Pt Will Perform UE Dressing In chair; With min assist  (LTG- Independent)   Pt Will Perform LE Dressing In chair; With min assist   Pt Will Perform Toileting With min assist  (LTG- Independent)   Plan   Treatment Interventions ADL retraining;Functional transfer training;UE strengthening/ROM; Endurance training;Cognitive reorientation;Patient/family training;Equipment evaluation/education; Compensatory technique education;Cardiac education   Goal Expiration Date 08/18/18   Treatment Day 1   OT Frequency 3-5x/wk   Recommendation   OT Discharge Recommendation Home OT  (and family support)   Barthel Index   Feeding 5   Bathing 0   Grooming Score 0   Dressing Score 5   Bladder Score 0   Bowels Score 10   Toilet Use Score 5   Transfers (Bed/Chair) Score 10   Mobility (Level Surface) Score 0   Stairs Score 0   Barthel Index Score 35   Licensure   [place] License Number  Julio Cesarh Amirah Hernandez [address], OTR/L, [place] Lic# [id number] 11-Sep-2022 13:42

## 2023-04-04 NOTE — CASE MANAGEMENT
Continued Stay Review    OBSERVATION 08/03/2018 @ 1837, CONVERTED TO INPATIENT ADMISSION 08/04/2018 @ 1134, DUE TO FURTHER DIAGNOSTIC WORKUP, REQUIRING AT LEAST 2 MIDNIGHT STAY  Date: 08/04/2018 08/04/18 1135  Inpatient Admission Once     Transfer Service: General Medicine       Question Answer Comment   Admitting Physician [facility], [facility]    Level of Care Med Surg    Estimated length of stay More than 2 Midnights    Certification I certify that inpatient services are medically necessary for this patient for a duration of greater than two midnights  See H&P and MD Progress Notes for additional information about the patient's course of treatment                Vital Signs: /65 (BP Location: Right arm)   Pulse 56   Temp 99 °F (37 2 °C) (Oral)   Resp 20   Wt 54 4 kg (120 lb)   SpO2 96%   BMI 24 24 kg/m²     Medications:   Scheduled Meds:   Current Facility-Administered Medications:  acetaminophen 650 mg Oral Q6H PRN Alyssa Revankar, DO    aspirin 81 mg Oral Daily Alyssa Revankar, DO    atorvastatin 40 mg Oral QPM Alyssa Revankar, DO    cefazolin 1,000 mg Intravenous Q12H Earlene Briggs MD Last Rate: 1,000 mg (08/04/18 0532)   docusate sodium 200 mg Oral Daily Alyssa Revankar, DO    ferrous sulfate 325 mg Oral Daily With Breakfast Alyssa Revankar, DO    lactulose 10 g Oral Daily Alyssa Revankar, DO    lisinopril 2 5 mg Oral BID Alyssa Revankar, DO    ondansetron 4 mg Intravenous Q6H PRN Alyssa Revankar, DO    pantoprazole 40 mg Oral Daily Alyssa Revankar, DO    polyethylene glycol 17 g Oral Daily Alyssa Revankar, DO        Abnormal Labs/Diagnostic Results:     Age/Sex: 80 y o  male     Assessment/Plan: 2337,   Complete heart block (HCC)  Active Problems:    Essential hypertension    History of CVA (cerebrovascular accident)    Chronic kidney disease, stage III (moderate)    Hyperlipidemia    Chronic indwelling Harper catheter    GERRI (iron deficiency anemia)        Assessment & Plan:   * Complete heart toro [facility])   Assessment & Plan     Patient seen by cardiology and is s/p Medtronic VVI pacemaker yesterday  As per Cardiology, pacemaker is functioning adequately  Monitor overnight on telemetry  Echo showed ejection fraction of 55 % with grade 1 diastolic dysfunction             History of CVA (cerebrovascular accident)   Assessment & Plan     History of prior right MCA CVA  Continue aspirin, statin          Essential hypertension   Assessment & Plan     Continue lisinopril           GERRI (iron deficiency anemia)   Assessment & Plan     Continue ferrous sulfate           Chronic indwelling Harper catheter   Assessment & Plan     Due to neurogenic bladder  Continue Harper catheter          Hyperlipidemia   Assessment & Plan     Continue statin          Chronic kidney disease, stage III (moderate)   Assessment & Plan     Creatinine appears at baseline             VTE Pharmacologic Prophylaxis:   Pharmacologic: Pharmacologic VTE Prophylaxis contraindicated due to PPM placement  Mechanical VTE Prophylaxis in Place:  Yes     Current Length of Stay: 1 day(s)     Current Patient Status: Inpatient      Discharge Plan: home    alert